# Patient Record
Sex: FEMALE | Race: WHITE | Employment: FULL TIME | URBAN - METROPOLITAN AREA
[De-identification: names, ages, dates, MRNs, and addresses within clinical notes are randomized per-mention and may not be internally consistent; named-entity substitution may affect disease eponyms.]

---

## 2019-10-15 ENCOUNTER — HOSPITAL ENCOUNTER (OUTPATIENT)
Dept: GENERAL RADIOLOGY | Age: 59
Discharge: HOME | End: 2019-10-17
Payer: COMMERCIAL

## 2019-10-15 ENCOUNTER — HOSPITAL ENCOUNTER (OUTPATIENT)
Age: 59
Discharge: HOME | End: 2019-10-17
Payer: COMMERCIAL

## 2019-10-15 DIAGNOSIS — K59.00 CONSTIPATION, UNSPECIFIED CONSTIPATION TYPE: ICD-10-CM

## 2019-10-15 PROCEDURE — 74022 RADEX COMPL AQT ABD SERIES: CPT

## 2023-01-27 DIAGNOSIS — R20.2 LEFT LEG PARESTHESIAS: Primary | ICD-10-CM

## 2023-01-30 ENCOUNTER — PROCEDURE VISIT (OUTPATIENT)
Dept: PHYSICAL MEDICINE AND REHAB | Age: 63
End: 2023-01-30
Payer: MEDICARE

## 2023-01-30 VITALS — WEIGHT: 158 LBS | HEIGHT: 68 IN | BODY MASS INDEX: 23.95 KG/M2

## 2023-01-30 DIAGNOSIS — R20.2 LEFT LEG PARESTHESIAS: ICD-10-CM

## 2023-01-30 DIAGNOSIS — M54.17 RADICULOPATHY, LUMBOSACRAL REGION: Primary | ICD-10-CM

## 2023-01-30 PROCEDURE — 95886 MUSC TEST DONE W/N TEST COMP: CPT | Performed by: PHYSICAL MEDICINE & REHABILITATION

## 2023-01-30 PROCEDURE — 1123F ACP DISCUSS/DSCN MKR DOCD: CPT | Performed by: PHYSICAL MEDICINE & REHABILITATION

## 2023-01-30 PROCEDURE — 95909 NRV CNDJ TST 5-6 STUDIES: CPT | Performed by: PHYSICAL MEDICINE & REHABILITATION

## 2023-01-30 PROCEDURE — 99202 OFFICE O/P NEW SF 15 MIN: CPT | Performed by: PHYSICAL MEDICINE & REHABILITATION

## 2023-01-30 NOTE — PROGRESS NOTES
[facility]  Electrodiagnostic Laboratory  *Accredited by the AANEM with exemplary status  [address] [facility] [place]  Phone: [phone]  Fax: [phone]      Date of Examination: 01/30/23  Patient Name: Odell Bourgeois    Chief Complaint   Patient presents with    Extremity Pain     Pain from the left glute down the back of the leg. Since October. No acute injury. 10/10 pain. Numbness     Numbness/tingling/burning in the bottom of leg and into the left foot. Extremity Weakness     Weakness when pain is present. Worse at the end of the day. Odell Bourgeois  is a 70y.o. year old female who was seen due to complaints of low back pain that radiates into left leg that has been present for over 3 months and started after no injury. Physical Exam: MSK: There is no joint effusion, deformity, instability, swelling, erythema or warmth. AROM is decreased in the spine. Neurologic:  No focal sensorimotor deficit. Impression:     1. Radiculopathy, lumbosacral region          Plan:   EMG is indicated to evaluate the above diagnosis. EMG was done today and showed left S1 motor radiculopathy. See report for full details. Recommend correlation with lumbar MRI, conservative treatment with physical therapy. Advised patient to follow up with referring provider. Thank you for allowing me to participate in the care of your patient.       Sincerely,     Stanton Ponce DO, [facility]   Board Certified Physical Medicine and Rehabilitation

## 2023-01-30 NOTE — PROGRESS NOTES
[facility]  Electrodiagnostic Laboratory  *[facility] the [facility] with [facility]  [address] [facility] [place] [address]  Phone: [phone]  Fax: [phone]    Referring Provider: Oksana Morillo APRN - C*  Primary Care Physician: JOANIE Chaney CNP  Patient Name: Patti Weeks  Patient YOB: 1960  Gender: female  BMI: Body mass index is 24.02 kg/m².  Height 5' 8\" (1.727 m), weight 158 lb (71.7 kg).    1/30/2023    Reason for Referral: Left leg paresthesia    Description of clinical problem:   Chief Complaint   Patient presents with    Extremity Pain     Pain from the left glute down the back of the leg. Since October. No acute injury. 10/10 pain.     Numbness     Numbness/tingling/burning in the bottom of leg and into the left foot.    Extremity Weakness     Weakness when pain is present. Worse at the end of the day.        Brief physical exam:   Sensory deficit No; Weakness No; Atrophy  No    Sensory NCS      Nerve / Sites Rec. Site Peak Lat PP Amp Segments Distance Velocity Temp.     ms µV  cm m/s °C   L Sural - Ankle (Calf)      Calf Ankle 4.84 8.9 Calf - Ankle 14 37 31.1   L Superficial peroneal - Ankle      Lat leg Ankle 3.33 4.3 Lat leg - Ankle 10 36 31.1       Motor NCS      Nerve / Sites Muscle Onset Amplitude Segments Distance Velocity Temp.     ms mV  cm m/s °C   L Peroneal - EDB      Ankle EDB 5.47 2.2 Ankle - EDB 8  31.1      Fib head EDB 12.92 2.1 Fib head - Ankle 30 40 31.1      Above Knee EDB 15.21 2.1 Above Knee - Fib head 10 44 31.1   L Tibial - AH      Ankle AH 5.68 11.7 Ankle - AH 8  31      Pop fossa AH 16.46 8.8 Pop fossa - Ankle 42 39 31       F Wave      Nerve Fmin % F    ms %   L Peroneal - EDB 53.96 4.35   L Tibial - AH 55.94 30       H Reflex      Nerve H Lat    ms   L Tibial - Soleus 36.98   R Tibial - Soleus 30.99       EMG      EMG Summary Table     Spontaneous MUAP Recruitment   Muscle Nerve Roots IA Fib PSW Fasc Amp Dur. PPP  Pattern   L. Tibialis anterior Deep peroneal (Fibular) L4-L5 N None None None N N N N   L. Extensor hallucis longus Deep peroneal (Fibular) L5-S1 1+ N 1+ None N N 1+ 1+   L. Gastrocnemius (Medial head) Tibial S1-S2 N None None None N N 1+ 1+   L. Peroneus longus Peroneal L5-S1 N None None None N N N N   L. Biceps femoris (short head) Sciatic (peroneal division) L5-S2 N None None None N N 1+ 1+   L. Vastus medialis Femoral L2-L4 N None None None N N N N   L. Gluteus medius Superior gluteal L4-S1 N None None None N N N 1+   L. Gluteus erik Inferior gluteal L5-S2 N None None None N N 1+ 1+   L. Lumbar paraspinals (low)  - Incr 1+ 1+ None       L. Lumbar paraspinals (mid)  - N None None None           Study Limitations:  none     Summary of Findings:   Nerve conduction studies: The following nerve conduction studies were abnormal: left tibial H-reflex was prolonged. All other nerve conduction studies, as listed in the table were normal in latency, amplitude and conduction velocity. Needle EMG:   Needle EMG was performed using a concentric needle. The following abnormalities were seen on needle EMG: there was active denervation in the left extensor hallucis longus and lower lumbar paraspinals. There was decreased recruitment with polyphasic potentials in the left extensor hallucis longus, medial gastrocnemius, biceps femoris short head, gluteus medius and gluteus erik muscles. Otherwise, observed motor units were normal in amplitude, duration, phases and recruitment and no active denervation signs were seen. Diagnostic Interpretation: This study was abnormal.      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 It is chronic in duration, moderate in severity. Prognosis for axonal lesions is poor and dependent upon collateral sprouting. Recommend correlation with lumbar MRI.        Previous Study: There is not a prior study for comparison. Follow up EMG is recommended if clinically indicated. Technologist: Parul [place]  Physician:Kristy Fowler 57, DO, [address] [place]   Board Certified Physical Medicine and Rehabilitation      Nerve conduction studies and electromyography were performed according to our laboratory policies and procedures which can be provided upon request. All abnormal values are identified in the table.  Laboratory normal values can also be provided upon request.       Cc: Kennard Prader, [address] [facility] [address], APRN - CNP

## 2023-01-30 NOTE — PATIENT INSTRUCTIONS
Electrodiagnotic Laboratory  Accredited by the [facility] with Exemplary status  GURDEEP Heller D.O. [facility]  [address] [facility] [place]  Phone: [phone]  Fax: [phone]        Today you had an electrodiagnostic exam which included nerve conduction studies (NCS) and electromyography (EMG). This test evaluated the electrical activity of your nerves and muscles to help determine if you have a nerve or muscle disease. This test can help determine the location and type of a nerve or muscle problem. This will help your referring doctor diagnose your condition and determine the appropriate next step in your treatment plan. After your test:    1. There are no long lasting side effects of the test.     2. You may resume your normal activities without restrictions. 3.  Resume any medications that were stopped for the test.     4  If you have sore areas or bruising in your muscles where the needle was placed, apply a cold pack to the sore area for 15-20 minutes three to four times a day as needed for pain. The soreness should go away in about 1-2 days. 5. Your results were provided  Briefly at the end of your test and the final detailed report will be provided to your referring physician, and/or primary care physician and any other parties you requested within 1-2 days of the examination. You may wish to contact your referring provider after a few days to determine what they would like you to do next. 6.  Please call [phone] with any questions or concerns and if you develop increased body temperature/fever, swelling, tenderness, increased pain and/or drainage from the sites where the needle was placed. Thank you for choosing us for your health care needs.

## 2023-02-23 DIAGNOSIS — M54.17 LUMBOSACRAL RADICULITIS: Primary | ICD-10-CM

## 2023-02-24 ENCOUNTER — PREP FOR PROCEDURE (OUTPATIENT)
Dept: PHYSICAL MEDICINE AND REHAB | Age: 63
End: 2023-02-24

## 2023-02-24 PROBLEM — M54.17 LUMBOSACRAL RADICULITIS: Status: ACTIVE | Noted: 2023-02-24

## 2023-02-27 RX ORDER — TRIAMTERENE AND HYDROCHLOROTHIAZIDE 37.5; 25 MG/1; MG/1
CAPSULE ORAL EVERY MORNING
COMMUNITY

## 2023-02-27 RX ORDER — LOSARTAN POTASSIUM 50 MG/1
50 TABLET ORAL DAILY
COMMUNITY

## 2023-02-27 RX ORDER — CLONIDINE HYDROCHLORIDE 0.1 MG/1
0.1 TABLET ORAL DAILY
COMMUNITY

## 2023-02-27 RX ORDER — PRAVASTATIN SODIUM 40 MG
40 TABLET ORAL DAILY
COMMUNITY

## 2023-02-27 RX ORDER — PROPRANOLOL HYDROCHLORIDE 20 MG/1
20 TABLET ORAL 2 TIMES DAILY
COMMUNITY

## 2023-02-27 RX ORDER — CYCLOBENZAPRINE HCL 5 MG
5 TABLET ORAL
COMMUNITY

## 2023-02-28 RX ORDER — SODIUM CHLORIDE 0.9 % (FLUSH) 0.9 %
5-40 SYRINGE (ML) INJECTION PRN
Status: CANCELLED | OUTPATIENT
Start: 2023-02-28

## 2023-02-28 RX ORDER — SODIUM CHLORIDE 9 MG/ML
INJECTION, SOLUTION INTRAVENOUS PRN
Status: CANCELLED | OUTPATIENT
Start: 2023-02-28

## 2023-02-28 RX ORDER — SODIUM CHLORIDE 0.9 % (FLUSH) 0.9 %
5-40 SYRINGE (ML) INJECTION EVERY 12 HOURS SCHEDULED
Status: CANCELLED | OUTPATIENT
Start: 2023-02-28

## 2023-03-02 ENCOUNTER — HOSPITAL ENCOUNTER (OUTPATIENT)
Age: 63
Setting detail: OUTPATIENT SURGERY
Discharge: HOME | End: 2023-03-02
Attending: PHYSICAL MEDICINE & REHABILITATION | Admitting: PHYSICAL MEDICINE & REHABILITATION
Payer: MEDICARE

## 2023-03-02 ENCOUNTER — HOSPITAL ENCOUNTER (OUTPATIENT)
Dept: OPERATING ROOM | Age: 63
Setting detail: OUTPATIENT SURGERY
Discharge: HOME | End: 2023-03-02
Attending: PHYSICAL MEDICINE & REHABILITATION
Payer: MEDICARE

## 2023-03-02 VITALS
SYSTOLIC BLOOD PRESSURE: 109 MMHG | RESPIRATION RATE: 63 BRPM | HEIGHT: 68 IN | BODY MASS INDEX: 23.95 KG/M2 | HEART RATE: 63 BPM | WEIGHT: 158 LBS | OXYGEN SATURATION: 99 % | DIASTOLIC BLOOD PRESSURE: 65 MMHG

## 2023-03-02 DIAGNOSIS — M51.9 LUMBAR DISC DISEASE: ICD-10-CM

## 2023-03-02 DIAGNOSIS — M54.17 LUMBOSACRAL RADICULITIS: ICD-10-CM

## 2023-03-02 PROCEDURE — 3209999900 FLUORO FOR SURGICAL PROCEDURES

## 2023-03-02 PROCEDURE — 7100000011 HC PHASE II RECOVERY - ADDTL 15 MIN: Performed by: PHYSICAL MEDICINE & REHABILITATION

## 2023-03-02 PROCEDURE — 6360000004 HC RX CONTRAST MEDICATION: Performed by: PHYSICAL MEDICINE & REHABILITATION

## 2023-03-02 PROCEDURE — 6360000002 HC RX W HCPCS: Performed by: PHYSICAL MEDICINE & REHABILITATION

## 2023-03-02 PROCEDURE — 3600000005 HC SURGERY LEVEL 5 BASE: Performed by: PHYSICAL MEDICINE & REHABILITATION

## 2023-03-02 PROCEDURE — 7100000010 HC PHASE II RECOVERY - FIRST 15 MIN: Performed by: PHYSICAL MEDICINE & REHABILITATION

## 2023-03-02 PROCEDURE — 2500000003 HC RX 250 WO HCPCS: Performed by: PHYSICAL MEDICINE & REHABILITATION

## 2023-03-02 PROCEDURE — 2580000003 HC RX 258: Performed by: PHYSICAL MEDICINE & REHABILITATION

## 2023-03-02 PROCEDURE — A4216 STERILE WATER/SALINE, 10 ML: HCPCS | Performed by: PHYSICAL MEDICINE & REHABILITATION

## 2023-03-02 PROCEDURE — 2709999900 HC NON-CHARGEABLE SUPPLY: Performed by: PHYSICAL MEDICINE & REHABILITATION

## 2023-03-02 RX ORDER — LIDOCAINE HYDROCHLORIDE 10 MG/ML
INJECTION, SOLUTION EPIDURAL; INFILTRATION; INTRACAUDAL; PERINEURAL PRN
Status: DISCONTINUED | OUTPATIENT
Start: 2023-03-02 | End: 2023-03-02 | Stop reason: ALTCHOICE

## 2023-03-02 RX ORDER — SODIUM CHLORIDE 9 MG/ML
INJECTION, SOLUTION INTRAVENOUS PRN
Status: DISCONTINUED | OUTPATIENT
Start: 2023-03-02 | End: 2023-03-02 | Stop reason: HOSPADM

## 2023-03-02 RX ORDER — SODIUM CHLORIDE 0.9 % (FLUSH) 0.9 %
5-40 SYRINGE (ML) INJECTION EVERY 12 HOURS SCHEDULED
Status: DISCONTINUED | OUTPATIENT
Start: 2023-03-02 | End: 2023-03-02 | Stop reason: HOSPADM

## 2023-03-02 RX ORDER — SODIUM CHLORIDE 0.9 % (FLUSH) 0.9 %
5-40 SYRINGE (ML) INJECTION PRN
Status: DISCONTINUED | OUTPATIENT
Start: 2023-03-02 | End: 2023-03-02 | Stop reason: HOSPADM

## 2023-03-02 ASSESSMENT — PAIN - FUNCTIONAL ASSESSMENT: PAIN_FUNCTIONAL_ASSESSMENT: 0-10

## 2023-03-02 ASSESSMENT — PAIN DESCRIPTION - LOCATION
LOCATION: BACK
LOCATION: BACK

## 2023-03-02 ASSESSMENT — PAIN DESCRIPTION - PAIN TYPE
TYPE: SURGICAL PAIN;CHRONIC PAIN
TYPE: CHRONIC PAIN;SURGICAL PAIN

## 2023-03-02 ASSESSMENT — PAIN SCALES - GENERAL
PAINLEVEL_OUTOF10: 6
PAINLEVEL_OUTOF10: 7

## 2023-03-02 NOTE — OP NOTE
LUMBOSACRAL EPIDURAL STEROID INJECTION, TRANSFORAMINAL APPROACH       WITH FLUOROSCOPIC GUIDANCE    Patient: Amauri Blanca                         MRN#: [id number]  : 1960   Date of procedure: 3/2/2023      Physician Performing Procedure:  Serena Canales DO    Clinical Scenario: As per electronic documentation. Diagnosis: lumbar radiculitis     Injectate: A total of 3cc, consisting of 1 cc of Dexamethasone 10mg/ml,  with the remainder of normal saline    Levels Treated:  left L4-5 neural foramen    Approach:   Transforaminal    Improvement after today's procedure: As per nursing record. Comments:  none     Pre-procedural evaluation: The patient was examined today just prior to performing the procedure listed above. The patient's heart rate was normal, lungs were clear to auscultation bilaterally. Procedure: The patient was prepped and draped in a sterile fashion in the prone position after informed consent was signed and all the patient's questions were answered including the risks, benefits, alternative treatment options, and prognosis. The risks include - but are not limited to - infection, allergic reaction, increased pain, lack of therapeutic benefit, steroid reaction, nerve damage, paralysis, stroke, epidural hematoma, syncope, headache, respiratory or cardiac arrest, and scar formation. The C-arm was positioned so that an oblique view of the neural foramen as noted above was visualized. The soft tissues overlying this structure were infiltrated with 2-3 cc. of 1% Lidocaine without Epinephrine. A 22 gauge 3.5 inch spinal needle was inserted toward the target using a trajectory view along the fluoroscope beam.  Under AP and lateral visualization, the needle was advanced so it did not puncture dura. Biplanar projections were used to confirm position. Aspiration was confirmed to be negative for CSF and/or blood. A 1-2 cc. volume of Isovue contrast was injected at this level. The contrast was observed to flow under the pedicle. Radiographs were obtained for documentation purposes. After attaining flow of contrast documented above, the above injectate was administered into the transforaminal epidural space. The patient tolerated the procedure well and was discharged after an appropriate period of observation. If there are any complications, the patient was instructed to call us. The patient is to follow-up with the requesting physician after the injection, preferably within three weeks.     Yanet Cordero DO, [facility]   Board Certified Physical Medicine and Rehabilitation

## 2023-03-02 NOTE — H&P
Juan C El, [address] [facility] Physical Medicine and Rehabilitation  [address] [facility] [facility] [facility] [place]  Phone: [phone]  Fax: [phone]    PCP: JOANIE Lockett CNP  Date of visit: 3/2/2023    CC: low back and leg pain       Ramya Johnson is a 70 y.o. female who presents today for epidural steroid injection. Patient complains of low back and leg pain. No red flag symptoms. Consents to proceed with procedure. Allergies   Allergen Reactions    Avelox [Moxifloxacin] Swelling    Penicillins Rash       Current Facility-Administered Medications   Medication Dose Route Frequency Provider Last Rate Last Admin    sodium chloride flush 0.9 % injection 5-40 mL  5-40 mL IntraVENous 2 times per day Kristy Christiansen, DO        sodium chloride flush 0.9 % injection 5-40 mL  5-40 mL IntraVENous PRN Kristy Christiansen, DO        0.9 % sodium chloride infusion   IntraVENous PRN Kristy Christiansen, DO           History reviewed. No pertinent past medical history. History reviewed. No pertinent surgical history. No family history on file. ROS:    Constitutional: Denies fevers, chills, night sweats, unintentional weight loss     Skin: Denies rash or skin changes     Respiratory: Denies SOB or cough     Cardiovascular: Denies CP, palpitations, edema      Neurologic: See HPI.     MSK: See HPI. Hematologic/Lymphatic/Immunologic: Denies bruising       Physical Exam:   Blood pressure 104/60, pulse 71, resp. rate 18, height 5' 8\" (1.727 m), weight 158 lb (71.7 kg), SpO2 100 %. General: well developed and well nourished in no acute distress  Resp: symmetrical chest expansion, unlabored breathing, respirations unlabored. CV: Heart rate is regular. Peripheral pulses are palpable  Skin: No rashes or ecchymosis. Normal turgor.    MSK: ttp lumbar psps          Impression:     Lumbar radiculitis      Plan:   Injection as planned today           [place] Aris Man, [address] [place] [address]   Board Certified Physical Medicine and Rehabilitation

## 2023-03-02 NOTE — DISCHARGE INSTRUCTIONS
Post-op instruction Block  Dr. Christiansen  [phone]      Rest 12-24 hours following procedure. DO NOT DRIVE till the following day.    Keep dressing clean and dry. Do not bathe, shower, or sit in hot tub the day of procedure .You may remove the dressing following A.M.    You may return to work/school tomorrow.    Drink extra fluids for the next 24 hours.    Resume your regular diet.    Resume previously prescribed medications. Resume Coumadin, Plavix, NSAIDS, Ibuprofen products 24 hours after procedure.    You need to have a responsible adult stay with you this evening.    If you experience any discomfort relating to this procedure, you may take Tylenol 2 tablets every 4 hrs as needed for pain and/or apply ice to the affected area.    Please follow up with Dr. Christiansen or Dr. Padgett. If you were a hip injection follow up with your orthopedic Doctor.    If you experience any unusual symptoms or problems, call your physician’s answering service at [phone] or go directly to [facility]’s Emergency Department.

## 2023-03-15 RX ORDER — SODIUM CHLORIDE 9 MG/ML
INJECTION, SOLUTION INTRAVENOUS PRN
Status: CANCELLED | OUTPATIENT
Start: 2023-03-15

## 2023-03-15 RX ORDER — SODIUM CHLORIDE 0.9 % (FLUSH) 0.9 %
5-40 SYRINGE (ML) INJECTION EVERY 12 HOURS SCHEDULED
Status: CANCELLED | OUTPATIENT
Start: 2023-03-15

## 2023-03-15 RX ORDER — SODIUM CHLORIDE 0.9 % (FLUSH) 0.9 %
5-40 SYRINGE (ML) INJECTION PRN
Status: CANCELLED | OUTPATIENT
Start: 2023-03-15

## 2023-03-16 ENCOUNTER — HOSPITAL ENCOUNTER (OUTPATIENT)
Age: 63
Setting detail: OUTPATIENT SURGERY
Discharge: HOME | End: 2023-03-16
Attending: PHYSICAL MEDICINE & REHABILITATION | Admitting: PHYSICAL MEDICINE & REHABILITATION
Payer: MEDICARE

## 2023-03-16 ENCOUNTER — HOSPITAL ENCOUNTER (OUTPATIENT)
Dept: OPERATING ROOM | Age: 63
Setting detail: OUTPATIENT SURGERY
Discharge: HOME | End: 2023-03-16
Attending: PHYSICAL MEDICINE & REHABILITATION
Payer: MEDICARE

## 2023-03-16 VITALS
WEIGHT: 158 LBS | OXYGEN SATURATION: 98 % | HEIGHT: 68 IN | SYSTOLIC BLOOD PRESSURE: 110 MMHG | RESPIRATION RATE: 14 BRPM | HEART RATE: 68 BPM | BODY MASS INDEX: 23.95 KG/M2 | DIASTOLIC BLOOD PRESSURE: 65 MMHG | TEMPERATURE: 98 F

## 2023-03-16 DIAGNOSIS — M51.9 LUMBAR DISC DISEASE: ICD-10-CM

## 2023-03-16 PROCEDURE — 6360000004 HC RX CONTRAST MEDICATION: Performed by: PHYSICAL MEDICINE & REHABILITATION

## 2023-03-16 PROCEDURE — 3209999900 FLUORO FOR SURGICAL PROCEDURES

## 2023-03-16 PROCEDURE — A4216 STERILE WATER/SALINE, 10 ML: HCPCS | Performed by: PHYSICAL MEDICINE & REHABILITATION

## 2023-03-16 PROCEDURE — 6360000002 HC RX W HCPCS: Performed by: PHYSICAL MEDICINE & REHABILITATION

## 2023-03-16 PROCEDURE — 2709999900 HC NON-CHARGEABLE SUPPLY: Performed by: PHYSICAL MEDICINE & REHABILITATION

## 2023-03-16 PROCEDURE — 2580000003 HC RX 258: Performed by: PHYSICAL MEDICINE & REHABILITATION

## 2023-03-16 PROCEDURE — 7100000010 HC PHASE II RECOVERY - FIRST 15 MIN: Performed by: PHYSICAL MEDICINE & REHABILITATION

## 2023-03-16 PROCEDURE — 2500000003 HC RX 250 WO HCPCS: Performed by: PHYSICAL MEDICINE & REHABILITATION

## 2023-03-16 PROCEDURE — 3600000005 HC SURGERY LEVEL 5 BASE: Performed by: PHYSICAL MEDICINE & REHABILITATION

## 2023-03-16 RX ORDER — SODIUM CHLORIDE 0.9 % (FLUSH) 0.9 %
5-40 SYRINGE (ML) INJECTION EVERY 12 HOURS SCHEDULED
Status: DISCONTINUED | OUTPATIENT
Start: 2023-03-16 | End: 2023-03-16 | Stop reason: HOSPADM

## 2023-03-16 RX ORDER — SODIUM CHLORIDE 9 MG/ML
INJECTION, SOLUTION INTRAVENOUS PRN
Status: DISCONTINUED | OUTPATIENT
Start: 2023-03-16 | End: 2023-03-16 | Stop reason: HOSPADM

## 2023-03-16 RX ORDER — SODIUM CHLORIDE 0.9 % (FLUSH) 0.9 %
5-40 SYRINGE (ML) INJECTION PRN
Status: DISCONTINUED | OUTPATIENT
Start: 2023-03-16 | End: 2023-03-16 | Stop reason: HOSPADM

## 2023-03-16 RX ORDER — LIDOCAINE HYDROCHLORIDE 10 MG/ML
INJECTION, SOLUTION EPIDURAL; INFILTRATION; INTRACAUDAL; PERINEURAL PRN
Status: DISCONTINUED | OUTPATIENT
Start: 2023-03-16 | End: 2023-03-16 | Stop reason: ALTCHOICE

## 2023-03-16 ASSESSMENT — PAIN - FUNCTIONAL ASSESSMENT: PAIN_FUNCTIONAL_ASSESSMENT: 0-10

## 2023-03-16 ASSESSMENT — PAIN DESCRIPTION - DESCRIPTORS: DESCRIPTORS: ACHING

## 2023-03-16 NOTE — OP NOTE
LUMBOSACRAL EPIDURAL STEROID INJECTION, TRANSFORAMINAL APPROACH       WITH FLUOROSCOPIC GUIDANCE    Patient: Wallace Perdomo                         MRN#: [id number]  : 1960   Date of procedure: 3/16/2023      Physician Performing Procedure:  Compa Baires DO    Clinical Scenario: As per electronic documentation. Diagnosis: lumbar radiculitis     Injectate: A total of 3cc, consisting of 1 cc of Dexamethasone 10mg/ml,  with the remainder of normal saline    Levels Treated:  left L4-5 neural foramen    Approach:  Transforaminal    Improvement after today's procedure: As per nursing record. Comments:  none     Pre-procedural evaluation: The patient was examined today just prior to performing the procedure listed above. The patient's heart rate was normal, lungs were clear to auscultation bilaterally. Procedure: The patient was prepped and draped in a sterile fashion in the prone position after informed consent was signed and all the patient's questions were answered including the risks, benefits, alternative treatment options, and prognosis. The risks include - but are not limited to - infection, allergic reaction, increased pain, lack of therapeutic benefit, steroid reaction, nerve damage, paralysis, stroke, epidural hematoma, syncope, headache, respiratory or cardiac arrest, and scar formation. The C-arm was positioned so that an oblique view of the neural foramen as noted above was visualized. The soft tissues overlying this structure were infiltrated with 2-3 cc. of 1% Lidocaine without Epinephrine. A 22 gauge 3.5 inch spinal needle was inserted toward the target using a trajectory view along the fluoroscope beam.  Under AP and lateral visualization, the needle was advanced so it did not puncture dura. Biplanar projections were used to confirm position. Aspiration was confirmed to be negative for CSF and/or blood. A 1-2 cc. volume of Isovue contrast was injected at this level. The contrast was observed to flow under the pedicle. Radiographs were obtained for documentation purposes. After attaining flow of contrast documented above, the above injectate was administered into the transforaminal epidural space. The patient tolerated the procedure well and was discharged after an appropriate period of observation. If there are any complications, the patient was instructed to call us. The patient is to follow-up with the requesting physician after the injection, preferably within three weeks.     Smith Marin DO, [facility]   Board Certified Physical Medicine and Rehabilitation

## 2023-03-16 NOTE — DISCHARGE INSTRUCTIONS
Post-op instruction Block  Dr. Joseph Pride  [phone]      Rest 12-24 hours following procedure. DO NOT DRIVE till the following day. Keep dressing clean and dry. Do not bathe, shower, or sit in hot tub the day of procedure . You may remove the dressing following A.M. You may return to work/school tomorrow. Drink extra fluids for the next 24 hours. Resume your regular diet. Resume previously prescribed medications. Resume Coumadin, Plavix, NSAIDS, Ibuprofen products 24 hours after procedure. You need to have a responsible adult stay with you this evening. If you experience any discomfort relating to this procedure, you may take Tylenol 2 tablets every 4 hrs as needed for pain and/or apply ice to the affected area. Please follow up with Dr. Joseph Pride or Dr. Alex Williamson. If you were a hip injection follow up with your orthopedic Doctor. If you experience any unusual symptoms or problems, call your physicians answering service at [phone] or go directly to [facility]. [facility] Emergency Department. Infection After Surgery: Care Instructions  Overview  After surgery, an infection is always possible. It doesn't mean that the surgery didn't go well. Because an infection can be serious, your doctor has taken steps to manage it. Your doctor checked the infection and cleaned it if necessary. Your doctor may have made an opening in the area so that the pus can drain out. You may have gauze in the cut so that the area will stay open and keep draining. You may need antibiotics. You will need to follow up with your doctor to make sure the infection has gone away. Follow-up care is a key part of your treatment and safety. Be sure to make and go to all appointments, and call your doctor if you are having problems. It's also a good idea to know your test results and keep a list of the medicines you take. How can you care for yourself at home?   Make sure your surgeon knows about the infection, especially if you saw another doctor about your symptoms. If your doctor prescribed antibiotics, take them as directed. Do not stop taking them just because you feel better. You need to take the full course of antibiotics. Ask your doctor if you can take an over-the-counter pain medicine, such as acetaminophen (Tylenol), ibuprofen (Advil, Motrin), or naproxen (Aleve). Be safe with medicines. Read and follow all instructions on the label. Do not take two or more pain medicines at the same time unless the doctor told you to. Many pain medicines have acetaminophen, which is Tylenol. Too much acetaminophen (Tylenol) can be harmful. Prop up the area on a pillow anytime you sit or lie down during the next 3 days. Try to keep it above the level of your heart. This will help reduce swelling. Keep the skin clean and dry. You may have a bandage over the cut (incision). A bandage helps the incision heal and protects it. Your doctor will tell you how to take care of this. Keep it clean and dry. You may have drainage from the wound. If your doctor told you how to care for your incision, follow your doctor's instructions. If you did not get instructions, follow this general advice:  Wash around the incision with clean water 2 times a day. Don't use hydrogen peroxide or alcohol, which can slow healing. When should you call for help? Call your doctor now or seek immediate medical care if:    You have signs that your infection is getting worse, such as: Increased pain, swelling, warmth, or redness in the area. Red streaks leading from the area. Pus draining from the wound. A new or higher fever. Watch closely for changes in your health, and be sure to contact your doctor if you have any problems. Where can you learn more? Go to http://www.woods.com/ and enter C340 to learn more about \"Infection After Surgery: Care Instructions. \"  Current as of: January 20, 2022 Content Version: 13.5  © 3950-0064 [employer]. Care instructions adapted under license by [facility] ([facility]). If you have questions about a medical condition or this instruction, always ask your healthcare professional. [address] any warranty or liability for your use of this information.

## 2023-04-26 DIAGNOSIS — M54.17 LUMBOSACRAL RADICULITIS: ICD-10-CM

## 2023-06-05 ENCOUNTER — OFFICE VISIT (OUTPATIENT)
Dept: PRIMARY CARE | Facility: CLINIC | Age: 63
End: 2023-06-05
Payer: MEDICARE

## 2023-06-05 VITALS
SYSTOLIC BLOOD PRESSURE: 116 MMHG | DIASTOLIC BLOOD PRESSURE: 70 MMHG | OXYGEN SATURATION: 99 % | HEART RATE: 66 BPM | HEIGHT: 68 IN | BODY MASS INDEX: 23.64 KG/M2 | RESPIRATION RATE: 14 BRPM | WEIGHT: 156 LBS

## 2023-06-05 DIAGNOSIS — N18.31 CHRONIC KIDNEY DISEASE, STAGE 3A (MULTI): ICD-10-CM

## 2023-06-05 DIAGNOSIS — E78.2 MIXED HYPERLIPIDEMIA: ICD-10-CM

## 2023-06-05 DIAGNOSIS — M54.17 LUMBOSACRAL RADICULITIS: ICD-10-CM

## 2023-06-05 DIAGNOSIS — I10 ESSENTIAL HYPERTENSION: ICD-10-CM

## 2023-06-05 DIAGNOSIS — F40.10 SOCIAL ANXIETY DISORDER: ICD-10-CM

## 2023-06-05 DIAGNOSIS — Z00.00 WELL ADULT EXAM: Primary | ICD-10-CM

## 2023-06-05 DIAGNOSIS — Z78.0 POSTMENOPAUSAL: ICD-10-CM

## 2023-06-05 DIAGNOSIS — Z12.31 VISIT FOR SCREENING MAMMOGRAM: ICD-10-CM

## 2023-06-05 DIAGNOSIS — Z23 ENCOUNTER FOR IMMUNIZATION: ICD-10-CM

## 2023-06-05 DIAGNOSIS — R80.9 PROTEINURIA, UNSPECIFIED TYPE: Chronic | ICD-10-CM

## 2023-06-05 DIAGNOSIS — R19.5 POSITIVE COLORECTAL CANCER SCREENING USING COLOGUARD TEST: ICD-10-CM

## 2023-06-05 PROBLEM — F12.90 MARIJUANA USE: Status: ACTIVE | Noted: 2023-06-05

## 2023-06-05 PROCEDURE — G0009 ADMIN PNEUMOCOCCAL VACCINE: HCPCS | Performed by: FAMILY MEDICINE

## 2023-06-05 PROCEDURE — 1160F RVW MEDS BY RX/DR IN RCRD: CPT | Performed by: FAMILY MEDICINE

## 2023-06-05 PROCEDURE — 1159F MED LIST DOCD IN RCRD: CPT | Performed by: FAMILY MEDICINE

## 2023-06-05 PROCEDURE — 1036F TOBACCO NON-USER: CPT | Performed by: FAMILY MEDICINE

## 2023-06-05 PROCEDURE — 3074F SYST BP LT 130 MM HG: CPT | Performed by: FAMILY MEDICINE

## 2023-06-05 PROCEDURE — 90677 PCV20 VACCINE IM: CPT | Performed by: FAMILY MEDICINE

## 2023-06-05 PROCEDURE — 3078F DIAST BP <80 MM HG: CPT | Performed by: FAMILY MEDICINE

## 2023-06-05 PROCEDURE — 99387 INIT PM E/M NEW PAT 65+ YRS: CPT | Performed by: FAMILY MEDICINE

## 2023-06-05 RX ORDER — TRIAMTERENE/HYDROCHLOROTHIAZID 37.5-25 MG
0.5 TABLET ORAL DAILY
Status: SHIPPED
Start: 2023-06-05

## 2023-06-05 RX ORDER — CYCLOBENZAPRINE HCL 5 MG
TABLET ORAL
COMMUNITY
Start: 2022-11-18 | End: 2023-06-05 | Stop reason: SDUPTHER

## 2023-06-05 RX ORDER — LOSARTAN POTASSIUM 50 MG/1
1 TABLET ORAL DAILY
COMMUNITY
Start: 2022-10-20 | End: 2023-07-31 | Stop reason: SDUPTHER

## 2023-06-05 RX ORDER — CLONIDINE HYDROCHLORIDE 0.1 MG/1
0.1 TABLET ORAL DAILY
Status: SHIPPED
Start: 2023-06-05

## 2023-06-05 RX ORDER — TRIAMTERENE/HYDROCHLOROTHIAZID 37.5-25 MG
1 TABLET ORAL DAILY
COMMUNITY
Start: 2022-10-20 | End: 2023-06-05 | Stop reason: DRUGHIGH

## 2023-06-05 RX ORDER — PROPRANOLOL HYDROCHLORIDE 20 MG/1
TABLET ORAL EVERY 8 HOURS
COMMUNITY
Start: 2022-10-20 | End: 2023-06-05 | Stop reason: DRUGHIGH

## 2023-06-05 RX ORDER — PRAVASTATIN SODIUM 40 MG/1
1 TABLET ORAL DAILY
COMMUNITY
Start: 2022-10-20

## 2023-06-05 RX ORDER — CYCLOBENZAPRINE HCL 5 MG
5 TABLET ORAL 3 TIMES DAILY PRN
Qty: 30 TABLET
Start: 2023-06-05 | End: 2023-07-12 | Stop reason: SDUPTHER

## 2023-06-05 RX ORDER — PROPRANOLOL HYDROCHLORIDE 20 MG/1
10 TABLET ORAL 2 TIMES DAILY
Status: SHIPPED
Start: 2023-06-05

## 2023-06-05 RX ORDER — CLONIDINE HYDROCHLORIDE 0.1 MG/1
TABLET ORAL EVERY 8 HOURS
COMMUNITY
Start: 2022-10-20 | End: 2023-06-05 | Stop reason: DRUGHIGH

## 2023-06-05 ASSESSMENT — PATIENT HEALTH QUESTIONNAIRE - PHQ9
10. IF YOU CHECKED OFF ANY PROBLEMS, HOW DIFFICULT HAVE THESE PROBLEMS MADE IT FOR YOU TO DO YOUR WORK, TAKE CARE OF THINGS AT HOME, OR GET ALONG WITH OTHER PEOPLE: SOMEWHAT DIFFICULT
2. FEELING DOWN, DEPRESSED OR HOPELESS: SEVERAL DAYS
1. LITTLE INTEREST OR PLEASURE IN DOING THINGS: SEVERAL DAYS
SUM OF ALL RESPONSES TO PHQ9 QUESTIONS 1 AND 2: 2

## 2023-06-05 NOTE — PROGRESS NOTES
Subjective   Patient ID: Yolis Garza is a 63 y.o. female who presents for New Patient Visit (Annual exam /).    63 y.o. female here to establish care. Past medical history, past surgical history, medications, allergies, family history, and social history reviewed with patient and updated in chart.     Has stage 3a chronic kidney disease secondary to hypertension. Follows with nephrology, Dr. Barros.         Well Adult Physical   Patient here for a comprehensive physical exam.The patient reports no problems    Yolis reports her health as Good.    Does the patient take any herbs or supplements that were not prescribed by a doctor? yes   Is the patiet taking calcium supplements? no   Is the patient taking aspirin daily? no    Outpatient Medications Prior to Visit   Medication Sig Dispense Refill    cyclobenzaprine (Flexeril) 5 mg tablet TAKE 1 TABLET BY MOUTH THREE (3) TIMES DAILY AS NEEDED      losartan (Cozaar) 50 mg tablet Take 1 tablet (50 mg) by mouth once daily.      pravastatin (Pravachol) 40 mg tablet Take 1 tablet (40 mg) by mouth once daily.      cloNIDine (Catapres) 0.1 mg tablet Take by mouth every 8 hours.      propranolol (Inderal) 20 mg tablet Take by mouth every 8 hours.      triamterene-hydrochlorothiazid (Maxzide-25) 37.5-25 mg tablet Take 1 tablet by mouth once daily.       No facility-administered medications prior to visit.       Social History     Socioeconomic History    Marital status:      Spouse name: None    Number of children: 1    Years of education: None    Highest education level: None   Occupational History    None   Tobacco Use    Smoking status: Former     Packs/day: 1.50     Years: 14.00     Pack years: 21.00     Types: Cigarettes     Start date:      Quit date:      Years since quittin.4    Smokeless tobacco: Never   Vaping Use    Vaping status: Never Used   Substance and Sexual Activity    Alcohol use: Not Currently    Drug use: Yes     Types: Marijuana     "Sexual activity: Not Currently   Other Topics Concern    None   Social History Narrative    None     Social Determinants of Health     Financial Resource Strain: Not on file   Food Insecurity: Not on file   Transportation Needs: Not on file   Physical Activity: Not on file   Stress: Not on file   Social Connections: Not on file   Intimate Partner Violence: Not on file   Housing Stability: Not on file        History:  LMP: At age 35  Menopause at 35 years (surgical menopause)  Last pap date: 20 years ago   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 Had normal paps after that.   : 1  Para: 1    E-cigarette/Vaping       Questions Responses    E-cigarette/Vaping Use Never User            Last Dental Exam: 6 months or less    Last Eye Exam: 6 months or less    Diet:  renal diet    Exercise: daily    Health Maintenance Due   Topic Date Due    Medicare Initial Physical (IPPE)  Never done    Lipid Panel  Never done    Bone Density Scan  Never done    MMR Vaccines (1 of 1 - Standard series) Never done    Hepatitis C Screening  Never done    Mammogram  Never done    COVID-19 Vaccine (5 - Booster for Pfizer series) 2022    Pneumococcal Vaccine: 65+ Years (2 - PPSV23 if available, else PCV20) 2023            Review of Systems      Objective   /70   Pulse 66   Resp 14   Ht 1.727 m (5' 8\")   Wt 70.8 kg (156 lb)   SpO2 99%   BMI 23.72 kg/m²     Physical Exam      Assessment/Plan   Problem List Items Addressed This Visit       Chronic kidney disease, stage 3a     - follows with nephrology, Dr. Barros          Essential hypertension     - controlled. Continue clonidine, losartan, propranolol, maxzide         Relevant Medications    losartan (Cozaar) 50 mg tablet    triamterene-hydrochlorothiazid (Maxzide-25) 37.5-25 mg tablet    cloNIDine (Catapres) 0.1 mg tablet    propranolol (Inderal) 20 mg tablet    Lumbosacral radiculitis     - follows with Dr. Cox-cari         Mixed " hyperlipidemia     - continue pravastatin         Relevant Medications    pravastatin (Pravachol) 40 mg tablet    Positive colorectal cancer screening using Cologuard test     -1/2023  - had colonoscopy 2/8/23 that showed 1 polyp and also showed diverticulosis. Follow up colonoscopy in 5 years         Proteinuria (Chronic)     - secondary to chronic kidney disease. Follows with nephrology          Social anxiety disorder     - continue clonidine and propranolol         Relevant Medications    cloNIDine (Catapres) 0.1 mg tablet    propranolol (Inderal) 20 mg tablet    Visit for screening mammogram     - mammogram August 2022 with [facility]   Will be due again in August 2023          Other Visit Diagnoses       Well adult exam    -  Primary    Postmenopausal        Encounter for immunization

## 2023-06-05 NOTE — ASSESSMENT & PLAN NOTE
-1/2023  - had colonoscopy 2/8/23 that showed 1 polyp and also showed diverticulosis. Follow up colonoscopy in 5 years

## 2023-07-12 ENCOUNTER — OFFICE VISIT (OUTPATIENT)
Dept: PRIMARY CARE | Facility: CLINIC | Age: 63
End: 2023-07-12
Payer: MEDICARE

## 2023-07-12 VITALS
SYSTOLIC BLOOD PRESSURE: 110 MMHG | BODY MASS INDEX: 23.64 KG/M2 | OXYGEN SATURATION: 96 % | HEIGHT: 68 IN | WEIGHT: 156 LBS | HEART RATE: 72 BPM | TEMPERATURE: 98.1 F | DIASTOLIC BLOOD PRESSURE: 80 MMHG | RESPIRATION RATE: 16 BRPM

## 2023-07-12 DIAGNOSIS — J02.9 SORE THROAT: ICD-10-CM

## 2023-07-12 DIAGNOSIS — M54.17 LUMBOSACRAL RADICULITIS: ICD-10-CM

## 2023-07-12 DIAGNOSIS — R09.89 RUNNY NOSE: ICD-10-CM

## 2023-07-12 DIAGNOSIS — R05.1 ACUTE COUGH: ICD-10-CM

## 2023-07-12 DIAGNOSIS — J02.9 ACUTE PHARYNGITIS, UNSPECIFIED ETIOLOGY: Primary | ICD-10-CM

## 2023-07-12 DIAGNOSIS — H65.93 BILATERAL NON-SUPPURATIVE OTITIS MEDIA: ICD-10-CM

## 2023-07-12 LAB — POC RAPID STREP: NEGATIVE

## 2023-07-12 PROCEDURE — 1160F RVW MEDS BY RX/DR IN RCRD: CPT | Performed by: FAMILY MEDICINE

## 2023-07-12 PROCEDURE — 99213 OFFICE O/P EST LOW 20 MIN: CPT | Performed by: FAMILY MEDICINE

## 2023-07-12 PROCEDURE — 1159F MED LIST DOCD IN RCRD: CPT | Performed by: FAMILY MEDICINE

## 2023-07-12 PROCEDURE — 87880 STREP A ASSAY W/OPTIC: CPT | Performed by: FAMILY MEDICINE

## 2023-07-12 PROCEDURE — 1036F TOBACCO NON-USER: CPT | Performed by: FAMILY MEDICINE

## 2023-07-12 PROCEDURE — 87635 SARS-COV-2 COVID-19 AMP PRB: CPT

## 2023-07-12 PROCEDURE — 87081 CULTURE SCREEN ONLY: CPT

## 2023-07-12 PROCEDURE — 3074F SYST BP LT 130 MM HG: CPT | Performed by: FAMILY MEDICINE

## 2023-07-12 PROCEDURE — 3079F DIAST BP 80-89 MM HG: CPT | Performed by: FAMILY MEDICINE

## 2023-07-12 RX ORDER — CYCLOBENZAPRINE HCL 5 MG
5 TABLET ORAL 3 TIMES DAILY PRN
Qty: 270 TABLET | Refills: 1 | Status: SHIPPED | OUTPATIENT
Start: 2023-07-12 | End: 2024-01-08

## 2023-07-12 RX ORDER — AZITHROMYCIN 250 MG/1
TABLET, FILM COATED ORAL
Qty: 6 TABLET | Refills: 0 | Status: SHIPPED | OUTPATIENT
Start: 2023-07-12 | End: 2023-07-17

## 2023-07-12 ASSESSMENT — ENCOUNTER SYMPTOMS
DIZZINESS: 0
CHILLS: 0
SWOLLEN GLANDS: 0
PALPITATIONS: 0
CONSTIPATION: 0
WHEEZING: 0
NUMBNESS: 0
COUGH: 1
SINUS PRESSURE: 0
CHEST TIGHTNESS: 0
ADENOPATHY: 0
HEMATURIA: 0
HEADACHES: 0
NAUSEA: 0
SHORTNESS OF BREATH: 1
DIAPHORESIS: 0
DYSPHORIC MOOD: 0
SORE THROAT: 1
FREQUENCY: 0
DIARRHEA: 0
NERVOUS/ANXIOUS: 0
POLYDIPSIA: 0
POLYPHAGIA: 0
FEVER: 0
ABDOMINAL PAIN: 0
CONFUSION: 0
TROUBLE SWALLOWING: 1
HOARSE VOICE: 0
DYSURIA: 0
UNEXPECTED WEIGHT CHANGE: 0
SINUS PAIN: 0
LIGHT-HEADEDNESS: 0
VOMITING: 0

## 2023-07-12 ASSESSMENT — PATIENT HEALTH QUESTIONNAIRE - PHQ9
SUM OF ALL RESPONSES TO PHQ9 QUESTIONS 1 AND 2: 0
1. LITTLE INTEREST OR PLEASURE IN DOING THINGS: NOT AT ALL
2. FEELING DOWN, DEPRESSED OR HOPELESS: NOT AT ALL

## 2023-07-12 NOTE — PROGRESS NOTES
Subjective   Patient ID: Yolis Garza is a 63 y.o. female who presents for sore throat, cough, ear pain, runny nose x 7 days.    Sore Throat   The current episode started in the past 7 days. The problem has been gradually worsening. Neither side of throat is experiencing more pain than the other. There has been no fever. The pain is at a severity of 6/10. Associated symptoms include congestion, coughing (worse in the morning; productive of phlegm), ear pain, a plugged ear sensation, shortness of breath and trouble swallowing. Pertinent negatives include no abdominal pain, diarrhea, drooling, headaches, hoarse voice, swollen glands or vomiting. She has had no exposure to strep or mono. She has tried acetaminophen for the symptoms. The treatment provided moderate relief.   She is a non-smoker. She is exposed to smoke through her .      Review of Systems   Constitutional:  Negative for chills, diaphoresis, fever and unexpected weight change.   HENT:  Positive for congestion, ear pain, sore throat and trouble swallowing. Negative for drooling, hoarse voice, sinus pressure, sinus pain and sneezing.    Respiratory:  Positive for cough (worse in the morning; productive of phlegm) and shortness of breath. Negative for chest tightness and wheezing.    Cardiovascular:  Negative for chest pain, palpitations and leg swelling.   Gastrointestinal:  Negative for abdominal pain, constipation, diarrhea, nausea and vomiting.   Endocrine: Negative for cold intolerance, heat intolerance, polydipsia, polyphagia and polyuria.   Genitourinary:  Negative for dysuria, frequency, hematuria and urgency.   Neurological:  Negative for dizziness, syncope, light-headedness, numbness and headaches.   Hematological:  Negative for adenopathy.   Psychiatric/Behavioral:  Negative for confusion and dysphoric mood. The patient is not nervous/anxious.        Objective   /80 (BP Location: Left arm, Patient Position: Sitting, BP Cuff Size:  "Adult)   Pulse 72   Temp 36.7 °C (98.1 °F)   Resp 16   Ht 1.727 m (5' 8\")   Wt 70.8 kg (156 lb)   SpO2 96%   BMI 23.72 kg/m²     Physical Exam  Vitals and nursing note reviewed.   Constitutional:       General: She is not in acute distress.     Appearance: Normal appearance.   HENT:      Head: Normocephalic and atraumatic.      Right Ear: Hearing and ear canal normal. Tympanic membrane is erythematous.      Left Ear: Hearing and ear canal normal. Tympanic membrane is erythematous.      Nose: Nose normal.      Mouth/Throat:      Lips: Pink.      Mouth: Mucous membranes are moist.      Pharynx: Posterior oropharyngeal erythema present. No oropharyngeal exudate.   Eyes:      Extraocular Movements: Extraocular movements intact.      Conjunctiva/sclera: Conjunctivae normal.      Pupils: Pupils are equal, round, and reactive to light.   Cardiovascular:      Rate and Rhythm: Normal rate and regular rhythm.      Heart sounds: No murmur heard.     No friction rub. No gallop.   Pulmonary:      Effort: Pulmonary effort is normal.      Breath sounds: Normal breath sounds. No wheezing, rhonchi or rales.   Abdominal:      General: Bowel sounds are normal. There is no distension.      Palpations: Abdomen is soft.      Tenderness: There is no abdominal tenderness.   Musculoskeletal:         General: Normal range of motion.      Cervical back: Normal range of motion and neck supple.   Skin:     General: Skin is warm and dry.   Neurological:      General: No focal deficit present.      Mental Status: She is alert and oriented to person, place, and time.      Deep Tendon Reflexes: Reflexes normal.   Psychiatric:         Mood and Affect: Mood normal.         Behavior: Behavior normal.         Thought Content: Thought content normal.         Judgment: Judgment normal.         Assessment/Plan   Problem List Items Addressed This Visit       Acute pharyngitis - Primary     - start taryn-araceli as directed  - check COVID-19 and send for " strep culture  - Call if symptoms worsen or persist.           Relevant Medications    azithromycin (Zithromax) 250 mg tablet    Bilateral non-suppurative otitis media     - start z-park as directed  - check COVID-19 and send for strep culture  - Call if symptoms worsen or persist.           Relevant Medications    azithromycin (Zithromax) 250 mg tablet    Lumbosacral radiculitis    Relevant Medications    cyclobenzaprine (Flexeril) 5 mg tablet     Other Visit Diagnoses       Sore throat        Relevant Orders    POCT rapid strep A manually resulted (Completed)    Sars-CoV-2 PCR, Symptomatic    Group A Streptococcus, Culture    Acute cough        Relevant Orders    Sars-CoV-2 PCR, Symptomatic    Runny nose        Relevant Orders    Sars-CoV-2 PCR, Symptomatic

## 2023-07-12 NOTE — PATIENT INSTRUCTIONS
Yolis Garza ,    Thank you for coming in today. We at [facility] appreciate your trust in our care. If you have any questions or concerns about the care you received today, please do not hesitate to contact us at [phone].    The following instructions were discussed today:    - start zithromax --> 2 pills on day 1 and 1 pill on days 2-5   - Call if symptoms worsen or persist.    - Follow up 12/5/2023 as scheduled.

## 2023-07-12 NOTE — ASSESSMENT & PLAN NOTE
- start z-park as directed  - check COVID-19 and send for strep culture  - Call if symptoms worsen or persist.

## 2023-07-13 LAB — SARS-COV-2 RESULT: NOT DETECTED

## 2023-07-15 LAB — GROUP A STREP SCREEN, CULTURE: NORMAL

## 2023-07-17 ENCOUNTER — TELEPHONE (OUTPATIENT)
Dept: PRIMARY CARE | Facility: CLINIC | Age: 63
End: 2023-07-17
Payer: MEDICARE

## 2023-07-31 DIAGNOSIS — I10 ESSENTIAL HYPERTENSION: ICD-10-CM

## 2023-08-01 RX ORDER — LOSARTAN POTASSIUM 50 MG/1
50 TABLET ORAL DAILY
Qty: 90 TABLET | Refills: 1 | Status: SHIPPED | OUTPATIENT
Start: 2023-08-01 | End: 2024-01-28

## 2023-12-05 ENCOUNTER — APPOINTMENT (OUTPATIENT)
Dept: PRIMARY CARE | Facility: CLINIC | Age: 63
End: 2023-12-05
Payer: MEDICARE

## 2023-12-13 ENCOUNTER — OFFICE VISIT (OUTPATIENT)
Dept: PRIMARY CARE CLINIC | Age: 63
End: 2023-12-13
Payer: MEDICARE

## 2023-12-13 VITALS
RESPIRATION RATE: 18 BRPM | WEIGHT: 153 LBS | BODY MASS INDEX: 23.19 KG/M2 | SYSTOLIC BLOOD PRESSURE: 112 MMHG | DIASTOLIC BLOOD PRESSURE: 76 MMHG | HEART RATE: 75 BPM | HEIGHT: 68 IN | TEMPERATURE: 97.1 F | OXYGEN SATURATION: 99 %

## 2023-12-13 DIAGNOSIS — R00.2 PALPITATIONS: ICD-10-CM

## 2023-12-13 DIAGNOSIS — F51.01 PRIMARY INSOMNIA: Primary | ICD-10-CM

## 2023-12-13 DIAGNOSIS — N18.31 CHRONIC KIDNEY DISEASE, STAGE 3A (HCC): ICD-10-CM

## 2023-12-13 DIAGNOSIS — E78.2 MIXED HYPERLIPIDEMIA: ICD-10-CM

## 2023-12-13 DIAGNOSIS — Z76.89 ESTABLISHING CARE WITH NEW DOCTOR, ENCOUNTER FOR: ICD-10-CM

## 2023-12-13 PROBLEM — F41.1 GENERALIZED ANXIETY DISORDER: Status: ACTIVE | Noted: 2023-12-13

## 2023-12-13 PROBLEM — J02.9 ACUTE PHARYNGITIS: Status: ACTIVE | Noted: 2023-07-12

## 2023-12-13 PROBLEM — I10 ESSENTIAL HYPERTENSION: Status: ACTIVE | Noted: 2020-10-30

## 2023-12-13 PROBLEM — R61 HYPERHIDROSIS: Status: ACTIVE | Noted: 2023-12-13

## 2023-12-13 PROBLEM — F40.10 SOCIAL ANXIETY DISORDER: Status: ACTIVE | Noted: 2023-06-05

## 2023-12-13 PROBLEM — F12.90 MARIJUANA USE: Status: ACTIVE | Noted: 2023-06-05

## 2023-12-13 PROBLEM — H65.93 BILATERAL NON-SUPPURATIVE OTITIS MEDIA: Status: ACTIVE | Noted: 2023-07-12

## 2023-12-13 PROBLEM — R80.9 PROTEINURIA: Chronic | Status: ACTIVE | Noted: 2023-05-08

## 2023-12-13 PROBLEM — R19.5 POSITIVE COLORECTAL CANCER SCREENING USING COLOGUARD TEST: Status: ACTIVE | Noted: 2023-06-05

## 2023-12-13 PROBLEM — I12.9 BENIGN HYPERTENSIVE KIDNEY DISEASE WITH CHRONIC KIDNEY DISEASE: Status: ACTIVE | Noted: 2023-02-17

## 2023-12-13 LAB
ALBUMIN SERPL-MCNC: 4.4 G/DL (ref 3.5–5.2)
ALP BLD-CCNC: 62 U/L (ref 35–104)
ALT SERPL-CCNC: 15 U/L (ref 0–32)
ANION GAP SERPL CALCULATED.3IONS-SCNC: 18 MMOL/L (ref 7–16)
AST SERPL-CCNC: 22 U/L (ref 0–31)
BILIRUB SERPL-MCNC: 0.4 MG/DL (ref 0–1.2)
BUN BLDV-MCNC: 16 MG/DL (ref 6–23)
CALCIUM SERPL-MCNC: 9.9 MG/DL (ref 8.6–10.2)
CHLORIDE BLD-SCNC: 100 MMOL/L (ref 98–107)
CHOLESTEROL: 214 MG/DL
CO2: 20 MMOL/L (ref 22–29)
CREAT SERPL-MCNC: 1.1 MG/DL (ref 0.5–1)
GFR SERPL CREATININE-BSD FRML MDRD: 58 ML/MIN/1.73M2
GLUCOSE BLD-MCNC: 93 MG/DL (ref 74–99)
HCT VFR BLD CALC: 38 % (ref 34–48)
HDLC SERPL-MCNC: 83 MG/DL
HEMOGLOBIN: 13 G/DL (ref 11.5–15.5)
LDL CHOLESTEROL: 115 MG/DL
MCH RBC QN AUTO: 30.4 PG (ref 26–35)
MCHC RBC AUTO-ENTMCNC: 34.2 G/DL (ref 32–34.5)
MCV RBC AUTO: 88.8 FL (ref 80–99.9)
PDW BLD-RTO: 13 % (ref 11.5–15)
PLATELET # BLD: 260 K/UL (ref 130–450)
PMV BLD AUTO: 9.5 FL (ref 7–12)
POTASSIUM SERPL-SCNC: 3.7 MMOL/L (ref 3.5–5)
RBC # BLD: 4.28 M/UL (ref 3.5–5.5)
SODIUM BLD-SCNC: 138 MMOL/L (ref 132–146)
TOTAL PROTEIN: 7.1 G/DL (ref 6.4–8.3)
TRIGL SERPL-MCNC: 78 MG/DL
VLDLC SERPL CALC-MCNC: 16 MG/DL
WBC # BLD: 5.8 K/UL (ref 4.5–11.5)

## 2023-12-13 PROCEDURE — 99204 OFFICE O/P NEW MOD 45 MIN: CPT | Performed by: FAMILY MEDICINE

## 2023-12-13 PROCEDURE — 1123F ACP DISCUSS/DSCN MKR DOCD: CPT | Performed by: FAMILY MEDICINE

## 2023-12-13 PROCEDURE — 3078F DIAST BP <80 MM HG: CPT | Performed by: FAMILY MEDICINE

## 2023-12-13 PROCEDURE — 3074F SYST BP LT 130 MM HG: CPT | Performed by: FAMILY MEDICINE

## 2023-12-13 PROCEDURE — 36415 COLL VENOUS BLD VENIPUNCTURE: CPT | Performed by: FAMILY MEDICINE

## 2023-12-13 RX ORDER — PREDNISONE 20 MG/1
TABLET ORAL
COMMUNITY
Start: 2023-01-19 | End: 2023-12-13

## 2023-12-13 RX ORDER — PRAVASTATIN SODIUM 40 MG
40 TABLET ORAL DAILY
Qty: 30 TABLET | Status: CANCELLED | OUTPATIENT
Start: 2023-12-13

## 2023-12-13 RX ORDER — HYDROXYZINE HYDROCHLORIDE 25 MG/1
25 TABLET, FILM COATED ORAL EVERY 8 HOURS PRN
Qty: 90 TABLET | Refills: 1 | Status: SHIPPED | OUTPATIENT
Start: 2023-12-13 | End: 2024-03-12

## 2023-12-13 RX ORDER — PSEUDOEPHEDRINE HCL 30 MG
300 TABLET ORAL DAILY
COMMUNITY

## 2023-12-13 RX ORDER — PROPRANOLOL HYDROCHLORIDE 20 MG/1
20 TABLET ORAL 2 TIMES DAILY
Qty: 180 TABLET | Refills: 1 | Status: SHIPPED | OUTPATIENT
Start: 2023-12-13

## 2023-12-13 SDOH — ECONOMIC STABILITY: FOOD INSECURITY: WITHIN THE PAST 12 MONTHS, YOU WORRIED THAT YOUR FOOD WOULD RUN OUT BEFORE YOU GOT MONEY TO BUY MORE.: NEVER TRUE

## 2023-12-13 SDOH — ECONOMIC STABILITY: FOOD INSECURITY: WITHIN THE PAST 12 MONTHS, THE FOOD YOU BOUGHT JUST DIDN'T LAST AND YOU DIDN'T HAVE MONEY TO GET MORE.: NEVER TRUE

## 2023-12-13 SDOH — ECONOMIC STABILITY: HOUSING INSECURITY
IN THE LAST 12 MONTHS, WAS THERE A TIME WHEN YOU DID NOT HAVE A STEADY PLACE TO SLEEP OR SLEPT IN A SHELTER (INCLUDING NOW)?: NO

## 2023-12-13 SDOH — ECONOMIC STABILITY: INCOME INSECURITY: HOW HARD IS IT FOR YOU TO PAY FOR THE VERY BASICS LIKE FOOD, HOUSING, MEDICAL CARE, AND HEATING?: NOT HARD AT ALL

## 2023-12-13 ASSESSMENT — PATIENT HEALTH QUESTIONNAIRE - PHQ9
SUM OF ALL RESPONSES TO PHQ QUESTIONS 1-9: 0
SUM OF ALL RESPONSES TO PHQ9 QUESTIONS 1 & 2: 0
SUM OF ALL RESPONSES TO PHQ QUESTIONS 1-9: 0
SUM OF ALL RESPONSES TO PHQ QUESTIONS 1-9: 0
2. FEELING DOWN, DEPRESSED OR HOPELESS: 0
SUM OF ALL RESPONSES TO PHQ QUESTIONS 1-9: 0
1. LITTLE INTEREST OR PLEASURE IN DOING THINGS: 0

## 2023-12-13 NOTE — PROGRESS NOTES
[facility]  Family Medicine      Patient:  Mary Bui 70 y.o. female  Date of Service: 12/13/23      Chief complaint:   Chief Complaint   Patient presents with    New Patient    Medication Refill         History of Present Illness   Mary Bui is a 70 y.o. female who presents to the clinic with complaints as above.     Establish Care with New Physician  Medical History discussed and updated in chart  Surgical History discussed and updated in chart  Social History discussed and updated in chart  Medications reviewed and updated in chart as appropriate  Dr. Bari Wilkins for nephrology  Clonidine for hyperhydrosis  See below for Acute/Chronic conditions addressed today     Insomnia  Hx anxiety  Has great difficulty sleeping  Has been taking 25mg flexeril at night to sleep  Back pain is improving, doesn't keep her up at night, the flexeril just helps her sleep  Sometimes uses marijuana to help her sleep, which does help  Chronic sleep issue, even back to her 20s  Trazodone didn't help in the past, made her feel funny  Discussed R/B/A to atarax, she is agreeable  Denies fever, chills, chest pain, shortness of breath, abdominal pain, nausea, vomiting, diarrhea, numbness, tingling, loss of bowel or bladder control     Current Health Maintenance:  Health Maintenance   Topic Date Due    Lipids  Never done    Depression Screen  Never done    HIV screen  Never done    Hepatitis C screen  Never done    Cervical cancer screen  Never done    Colorectal Cancer Screen  Never done    Breast cancer screen  Never done    DEXA (modify frequency per FRAX score)  Never done    Respiratory Syncytial Virus (RSV) age 61 yrs+ (1 - 1-dose 60+ series) Never done    Annual Wellness Visit (AWV)  Never done    Flu vaccine (1) 08/01/2023    COVID-19 Vaccine (6 - 2023-24 season) 09/01/2023    DTaP/Tdap/Td vaccine (2 - Td or Tdap) 06/05/2027    Shingles vaccine  Completed    Pneumococcal 65+ years Vaccine  Completed

## 2023-12-27 DIAGNOSIS — F51.01 PRIMARY INSOMNIA: ICD-10-CM

## 2023-12-27 RX ORDER — HYDROXYZINE HYDROCHLORIDE 25 MG/1
25 TABLET, FILM COATED ORAL EVERY 8 HOURS PRN
Qty: 90 TABLET | Refills: 1 | Status: SHIPPED | OUTPATIENT
Start: 2023-12-27 | End: 2024-03-26

## 2023-12-27 NOTE — TELEPHONE ENCOUNTER
Last Appointment   12/13/2023  Next Appointment  1/16/2024      Pt called and requested this medication be sent to [facility]. It was originally sent to [facility] and it isn't on their formulary. [facility] states that she will be able to fill it there.      Thank You

## 2024-01-12 DIAGNOSIS — I10 ESSENTIAL HYPERTENSION: ICD-10-CM

## 2024-01-12 RX ORDER — LOSARTAN POTASSIUM 50 MG/1
50 TABLET ORAL DAILY
Qty: 90 TABLET | Refills: 3 | OUTPATIENT
Start: 2024-01-12

## 2024-01-16 ENCOUNTER — OFFICE VISIT (OUTPATIENT)
Dept: PRIMARY CARE CLINIC | Age: 64
End: 2024-01-16
Payer: MEDICARE

## 2024-01-16 VITALS
SYSTOLIC BLOOD PRESSURE: 101 MMHG | DIASTOLIC BLOOD PRESSURE: 67 MMHG | TEMPERATURE: 97.3 F | HEART RATE: 89 BPM | RESPIRATION RATE: 16 BRPM | BODY MASS INDEX: 23.86 KG/M2 | HEIGHT: 68 IN | WEIGHT: 157.4 LBS | OXYGEN SATURATION: 99 %

## 2024-01-16 DIAGNOSIS — F51.01 PRIMARY INSOMNIA: Primary | ICD-10-CM

## 2024-01-16 DIAGNOSIS — Z12.31 ENCOUNTER FOR SCREENING MAMMOGRAM FOR MALIGNANT NEOPLASM OF BREAST: ICD-10-CM

## 2024-01-16 PROCEDURE — 3078F DIAST BP <80 MM HG: CPT | Performed by: FAMILY MEDICINE

## 2024-01-16 PROCEDURE — 1123F ACP DISCUSS/DSCN MKR DOCD: CPT | Performed by: FAMILY MEDICINE

## 2024-01-16 PROCEDURE — 3074F SYST BP LT 130 MM HG: CPT | Performed by: FAMILY MEDICINE

## 2024-01-16 PROCEDURE — 99213 OFFICE O/P EST LOW 20 MIN: CPT | Performed by: FAMILY MEDICINE

## 2024-01-16 RX ORDER — ZOLPIDEM TARTRATE 5 MG/1
5 TABLET ORAL NIGHTLY PRN
Qty: 30 TABLET | Refills: 0 | Status: SHIPPED | OUTPATIENT
Start: 2024-01-16 | End: 2024-02-15

## 2024-01-16 RX ORDER — ASPIRIN 81 MG/1
1 TABLET ORAL DAILY
COMMUNITY
Start: 2022-10-20

## 2024-01-16 ASSESSMENT — PATIENT HEALTH QUESTIONNAIRE - PHQ9
2. FEELING DOWN, DEPRESSED OR HOPELESS: 0
SUM OF ALL RESPONSES TO PHQ9 QUESTIONS 1 & 2: 0
SUM OF ALL RESPONSES TO PHQ QUESTIONS 1-9: 0
1. LITTLE INTEREST OR PLEASURE IN DOING THINGS: 0
SUM OF ALL RESPONSES TO PHQ QUESTIONS 1-9: 0

## 2024-01-16 NOTE — PROGRESS NOTES
[facility]  Family Medicine      Patient:  Patti Weeks 64 y.o. female  Date of Service: 1/16/24      Chief complaint:   Chief Complaint   Patient presents with    Follow-up     Insomnia          History of Present Illness   Patti Weeks is a 64 y.o. female who presents to the clinic with complaints as above.    Insomnia Follow Up  Atarax didn't work and caused a rash, Trazodone didn't help in the past  Long hx insomnia, since childhood  Hx sleep walking  Usually cannot fall asleep until very early in the morning, sleeps only a few hours  Daytime fatigue  Denies fever, chills, chest pain, shortness of breath, abdominal pain, nausea, vomiting, diarrhea, numbness, tingling, loss of bowel or bladder control     Current Health Maintenance:  Health Maintenance   Topic Date Due    Hepatitis C screen  Never done    Colorectal Cancer Screen  Never done    Breast cancer screen  Never done    DEXA (modify frequency per FRAX score)  Never done    Respiratory Syncytial Virus (RSV) Pregnant or age 60 yrs+ (1 - 1-dose 60+ series) Never done    Flu vaccine (1) 08/01/2023    COVID-19 Vaccine (5 - 2023-24 season) 09/01/2023    Annual Wellness Visit (Medicare Advantage)  Never done    Lipids  12/13/2024    Depression Screen  12/13/2024    GFR test (Diabetes, CKD 3-4, OR last GFR 15-59)  12/13/2024    DTaP/Tdap/Td vaccine (2 - Td or Tdap) 06/05/2027    Shingles vaccine  Completed    Pneumococcal 65+ years Vaccine  Completed    Hepatitis A vaccine  Aged Out    Hepatitis B vaccine  Aged Out    Hib vaccine  Aged Out    Polio vaccine  Aged Out    Meningococcal (ACWY) vaccine  Aged Out    Pneumococcal 0-64 years Vaccine  Discontinued       Past Medical History:      Diagnosis Date    Anxiety     Axillary hyperhidrosis     CKD (chronic kidney disease)     HTN (hypertension)     Hyperlipidemia     Palpitations        Past Surgical History:        Procedure Laterality Date    PAIN MANAGEMENT PROCEDURE Left 3/2/2023

## 2024-02-13 ENCOUNTER — OFFICE VISIT (OUTPATIENT)
Dept: PRIMARY CARE CLINIC | Age: 64
End: 2024-02-13
Payer: MEDICARE

## 2024-02-13 VITALS
HEIGHT: 68 IN | BODY MASS INDEX: 23.76 KG/M2 | HEART RATE: 75 BPM | SYSTOLIC BLOOD PRESSURE: 97 MMHG | TEMPERATURE: 97.8 F | DIASTOLIC BLOOD PRESSURE: 61 MMHG | RESPIRATION RATE: 16 BRPM | OXYGEN SATURATION: 97 % | WEIGHT: 156.8 LBS

## 2024-02-13 DIAGNOSIS — Z76.0 MEDICATION REFILL: ICD-10-CM

## 2024-02-13 DIAGNOSIS — F51.01 PRIMARY INSOMNIA: Primary | ICD-10-CM

## 2024-02-13 PROCEDURE — 3078F DIAST BP <80 MM HG: CPT | Performed by: FAMILY MEDICINE

## 2024-02-13 PROCEDURE — 99213 OFFICE O/P EST LOW 20 MIN: CPT | Performed by: FAMILY MEDICINE

## 2024-02-13 PROCEDURE — 1123F ACP DISCUSS/DSCN MKR DOCD: CPT | Performed by: FAMILY MEDICINE

## 2024-02-13 PROCEDURE — 3074F SYST BP LT 130 MM HG: CPT | Performed by: FAMILY MEDICINE

## 2024-02-13 RX ORDER — PRAVASTATIN SODIUM 40 MG
40 TABLET ORAL DAILY
Qty: 90 TABLET | Refills: 1 | Status: SHIPPED | OUTPATIENT
Start: 2024-02-13

## 2024-02-13 RX ORDER — LOSARTAN POTASSIUM 50 MG/1
50 TABLET ORAL DAILY
Qty: 90 TABLET | Refills: 1 | Status: SHIPPED | OUTPATIENT
Start: 2024-02-13

## 2024-02-13 RX ORDER — TRIAMTERENE AND HYDROCHLOROTHIAZIDE 37.5; 25 MG/1; MG/1
1 CAPSULE ORAL EVERY MORNING
Qty: 45 CAPSULE | Refills: 1 | Status: SHIPPED
Start: 2024-02-13 | End: 2024-02-15 | Stop reason: DRUGHIGH

## 2024-02-13 RX ORDER — CYCLOBENZAPRINE HCL 5 MG
5 TABLET ORAL 3 TIMES DAILY PRN
Qty: 90 TABLET | Refills: 1 | Status: SHIPPED | OUTPATIENT
Start: 2024-02-13 | End: 2024-04-13

## 2024-02-13 NOTE — PROGRESS NOTES
Surgical History:        Procedure Laterality Date    PAIN MANAGEMENT PROCEDURE Left 3/2/2023    LEFT L4-5 TRANSFORAMINAL EPIDURAL STEROID INJECTION performed by Kristy Christiansen DO at [facility] OR    PAIN MANAGEMENT PROCEDURE Left 3/16/2023    LEFT  L4-5 TRANSFORAMINAL  EPIDURAL STEROID INJECTION performed by Kristy Christiansen DO at [facility] OR       Allergies:    Bee venom, Avelox [moxifloxacin], and Penicillins    Social History:   Social History     Socioeconomic History    Marital status:      Spouse name: Not on file    Number of children: Not on file    Years of education: Not on file    Highest education level: Not on file   Occupational History    Not on file   Tobacco Use    Smoking status: Never     Passive exposure: Current    Smokeless tobacco: Never   Substance and Sexual Activity    Alcohol use: Never    Drug use: Yes     Types: Marijuana (Weed)     Comment: at night    Sexual activity: Not on file   Other Topics Concern    Not on file   Social History Narrative    Not on file     Social Determinants of Health     Financial Resource Strain: Low Risk  (12/13/2023)    Overall Financial Resource Strain (CARDIA)     Difficulty of Paying Living Expenses: Not hard at all   Food Insecurity: No Food Insecurity (12/13/2023)    Hunger Vital Sign     Worried About Running Out of Food in the Last Year: Never true     Ran Out of Food in the Last Year: Never true   Transportation Needs: Unknown (12/13/2023)    PRAPARE - Transportation     Lack of Transportation (Medical): Not on file     Lack of Transportation (Non-Medical): No   Physical Activity: Not on file   Stress: Not on file   Social Connections: Not on file   Intimate Partner Violence: Not on file   Housing Stability: Unknown (12/13/2023)    Housing Stability Vital Sign     Unable to Pay for Housing in the Last Year: Not on file     Number of Places Lived in the Last Year: Not on file     Unstable Housing in the Last Year: No     DC instructions

## 2024-02-15 RX ORDER — TRIAMTERENE AND HYDROCHLOROTHIAZIDE 37.5; 25 MG/1; MG/1
0.5 TABLET ORAL DAILY
Qty: 45 TABLET | Refills: 1 | Status: SHIPPED | OUTPATIENT
Start: 2024-02-15

## 2024-05-14 ENCOUNTER — OFFICE VISIT (OUTPATIENT)
Dept: PRIMARY CARE CLINIC | Age: 64
End: 2024-05-14
Payer: MEDICARE

## 2024-05-14 VITALS
TEMPERATURE: 96.8 F | WEIGHT: 157.8 LBS | BODY MASS INDEX: 23.92 KG/M2 | SYSTOLIC BLOOD PRESSURE: 102 MMHG | HEART RATE: 66 BPM | DIASTOLIC BLOOD PRESSURE: 62 MMHG | HEIGHT: 68 IN | OXYGEN SATURATION: 98 %

## 2024-05-14 DIAGNOSIS — N18.31 CHRONIC KIDNEY DISEASE, STAGE 3A (HCC): ICD-10-CM

## 2024-05-14 DIAGNOSIS — F51.01 PRIMARY INSOMNIA: Primary | ICD-10-CM

## 2024-05-14 LAB
ANION GAP SERPL CALCULATED.3IONS-SCNC: 8 MMOL/L (ref 7–16)
BUN BLDV-MCNC: 15 MG/DL (ref 6–23)
CALCIUM SERPL-MCNC: 9.8 MG/DL (ref 8.6–10.2)
CHLORIDE BLD-SCNC: 101 MMOL/L (ref 98–107)
CO2: 27 MMOL/L (ref 22–29)
CREAT SERPL-MCNC: 1 MG/DL (ref 0.5–1)
GFR, ESTIMATED: 60 ML/MIN/1.73M2
GLUCOSE BLD-MCNC: 93 MG/DL (ref 74–99)
POTASSIUM SERPL-SCNC: 4.1 MMOL/L (ref 3.5–5)
SODIUM BLD-SCNC: 136 MMOL/L (ref 132–146)

## 2024-05-14 PROCEDURE — 36415 COLL VENOUS BLD VENIPUNCTURE: CPT | Performed by: FAMILY MEDICINE

## 2024-05-14 PROCEDURE — 99213 OFFICE O/P EST LOW 20 MIN: CPT | Performed by: FAMILY MEDICINE

## 2024-05-14 PROCEDURE — 3078F DIAST BP <80 MM HG: CPT | Performed by: FAMILY MEDICINE

## 2024-05-14 PROCEDURE — 1123F ACP DISCUSS/DSCN MKR DOCD: CPT | Performed by: FAMILY MEDICINE

## 2024-05-14 PROCEDURE — 3074F SYST BP LT 130 MM HG: CPT | Performed by: FAMILY MEDICINE
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
## 2024-06-05 LAB
BILIRUBIN, URINE: NEGATIVE
BLOOD, URINE: NEGATIVE
CLARITY: CLEAR
COLOR: YELLOW
CREATININE URINE: 82.8 MG/DL
GLUCOSE URINE: NEGATIVE
KETONES, URINE: NEGATIVE
LEUKOCYTE ESTERASE, URINE: NEGATIVE
MAGNESIUM: 2.1 MG/DL
MICROALBUMIN/CREAT 24H UR: <1.2 MG/DL
MICROALBUMIN/CREAT UR-RTO: NORMAL
NITRITE, URINE: NEGATIVE
PH UA: 7 (ref 4.5–8)
PHOSPHORUS: 3.9 MG/DL
PROTEIN UA: NEGATIVE
PTH INTACT: 63
SPECIFIC GRAVITY, URINE: 1.01
URIC ACID: 5.4
UROBILINOGEN, URINE: NORMAL
VITAMIN D 25-HYDROXY: 21

## 2024-06-07 LAB
BASOPHILS ABSOLUTE: 0.09 /ΜL
BASOPHILS RELATIVE PERCENT: 1.6 %
EOSINOPHILS ABSOLUTE: 0.19 /ΜL
EOSINOPHILS RELATIVE PERCENT: 3.3 %
HCT VFR BLD CALC: 36.2 % (ref 36–46)
HEMOGLOBIN: 12.7 G/DL (ref 12–16)
LYMPHOCYTES ABSOLUTE: 1.88 /ΜL
LYMPHOCYTES RELATIVE PERCENT: 32.9 %
MCH RBC QN AUTO: 29.8 PG
MCHC RBC AUTO-ENTMCNC: 35.1 G/DL
MCV RBC AUTO: 85 FL
MONOCYTES ABSOLUTE: 0.58 /ΜL
MONOCYTES RELATIVE PERCENT: 10.1 %
NEUTROPHILS ABSOLUTE: 2.97 /ΜL
NEUTROPHILS RELATIVE PERCENT: 51.9 %
PLATELET # BLD: 261 K/ΜL
PMV BLD AUTO: 9.4 FL
RBC # BLD: 4.26 10^6/ΜL
WBC # BLD: 5.7 10^3/ML

## 2024-07-13 DIAGNOSIS — Z76.0 MEDICATION REFILL: ICD-10-CM

## 2024-07-15 RX ORDER — LOSARTAN POTASSIUM 50 MG/1
50 TABLET ORAL DAILY
Qty: 90 TABLET | Refills: 3 | Status: SHIPPED | OUTPATIENT
Start: 2024-07-15

## 2024-07-15 NOTE — TELEPHONE ENCOUNTER
losartan (COZAAR) 50 MG tablet     Last Appointment:  5/14/2024  Future Appointments   Date Time Provider Department Center   7/18/2024  9:00 AM Joy Ramirez, APRN - [facility]

## 2024-08-01 DIAGNOSIS — Z76.0 MEDICATION REFILL: ICD-10-CM

## 2024-08-01 RX ORDER — PRAVASTATIN SODIUM 40 MG
40 TABLET ORAL DAILY
Qty: 90 TABLET | Refills: 1 | Status: SHIPPED | OUTPATIENT
Start: 2024-08-01

## 2024-08-01 NOTE — TELEPHONE ENCOUNTER
pravastatin (PRAVACHOL) 40 MG tablet     Last Appointment:  5/14/2024  No future appointments.     Component      Latest Ref Rng 12/13/2023   Cholesterol, Total      <200 mg/dL 214 (H)    HDL Cholesterol      >40 mg/dL 83    LDL Cholesterol      <100 mg/dL 115 (H)    Triglycerides      <150 mg/dL 78    VLDL      mg/dL 16

## 2024-08-12 DIAGNOSIS — Z76.0 MEDICATION REFILL: ICD-10-CM

## 2024-08-13 RX ORDER — CYCLOBENZAPRINE HCL 5 MG
5 TABLET ORAL 3 TIMES DAILY PRN
Qty: 90 TABLET | Refills: 1 | Status: SHIPPED | OUTPATIENT
Start: 2024-08-13 | End: 2024-10-12

## 2024-10-09 NOTE — TELEPHONE ENCOUNTER
Name of Medication(s) Requested:  Requested Prescriptions     Pending Prescriptions Disp Refills    triamterene-hydroCHLOROthiazide (MAXZIDE-25) 37.5-25 MG per tablet [Pharmacy Med Name: Triamterene-HCTZ 37.5-25 MG Oral Tablet] 45 tablet 3     Sig: TAKE ONE-HALF TABLET BY MOUTH  DAILY       Medication is on current medication list Yes    Dosage and directions were verified? Yes    Quantity verified: 90 day supply     Pharmacy Verified?  Yes    Last Appointment:  5/14/2024    Future appts:  No future appointments.     (If no appt send self scheduling link. .REFILLAPPT)  Scheduling request sent?     [] Yes  [x] No    Does patient need updated?  [] Yes  [x] No

## 2024-10-11 RX ORDER — TRIAMTERENE/HYDROCHLOROTHIAZID 37.5-25 MG
0.5 TABLET ORAL DAILY
Qty: 45 TABLET | Refills: 3 | Status: SHIPPED | OUTPATIENT
Start: 2024-10-11

## 2024-11-07 DIAGNOSIS — Z76.0 MEDICATION REFILL: ICD-10-CM

## 2024-11-07 RX ORDER — CYCLOBENZAPRINE HCL 5 MG
TABLET ORAL
Qty: 90 TABLET | Refills: 1 | Status: SHIPPED | OUTPATIENT
Start: 2024-11-07

## 2024-11-07 NOTE — TELEPHONE ENCOUNTER
cyclobenzaprine (FLEXERIL) 5 MG tablet     Last Appointment:  05/14/2024  No future appointments.

## 2024-12-05 NOTE — TELEPHONE ENCOUNTER
Name of Medication(s) Requested:  Requested Prescriptions     Pending Prescriptions Disp Refills    cloNIDine (CATAPRES) 0.1 MG tablet 60 tablet      Sig: Take 1 tablet by mouth daily 1/2 tablet daily       Medication is on current medication list Yes    Dosage and directions were verified? No    Quantity verified: 30 day supply     Pharmacy Verified?  Yes    Last Appointment:  5/14/2024    Future appts:  Future Appointments   Date Time Provider Department Center   1/21/2025 10:45 AM Melinda Joseph DO [facility]        (If no appt send self scheduling link. .REFILLAPPT)  Scheduling request sent?     [] Yes  [x] No    Does patient need updated?  [] Yes  [x] No

## 2024-12-06 RX ORDER — CLONIDINE HYDROCHLORIDE 0.1 MG/1
0.1 TABLET ORAL DAILY
Qty: 60 TABLET | Refills: 1 | Status: SHIPPED | OUTPATIENT
Start: 2024-12-06

## 2025-01-21 ENCOUNTER — OFFICE VISIT (OUTPATIENT)
Dept: PRIMARY CARE CLINIC | Age: 65
End: 2025-01-21
Payer: MEDICARE

## 2025-01-21 VITALS
TEMPERATURE: 97.5 F | HEART RATE: 57 BPM | DIASTOLIC BLOOD PRESSURE: 76 MMHG | SYSTOLIC BLOOD PRESSURE: 113 MMHG | HEIGHT: 68 IN | BODY MASS INDEX: 24.22 KG/M2 | OXYGEN SATURATION: 99 % | WEIGHT: 159.8 LBS

## 2025-01-21 DIAGNOSIS — N18.31 CHRONIC KIDNEY DISEASE, STAGE 3A (HCC): ICD-10-CM

## 2025-01-21 DIAGNOSIS — L21.9 SEBORRHEIC DERMATITIS: Primary | ICD-10-CM

## 2025-01-21 DIAGNOSIS — F51.01 PRIMARY INSOMNIA: ICD-10-CM

## 2025-01-21 LAB
ANION GAP SERPL CALCULATED.3IONS-SCNC: 11 MMOL/L (ref 7–16)
BUN BLDV-MCNC: 17 MG/DL (ref 6–23)
CALCIUM SERPL-MCNC: 9.8 MG/DL (ref 8.6–10.2)
CHLORIDE BLD-SCNC: 103 MMOL/L (ref 98–107)
CO2: 26 MMOL/L (ref 22–29)
CREAT SERPL-MCNC: 1.1 MG/DL (ref 0.5–1)
GFR, ESTIMATED: 57 ML/MIN/1.73M2
GLUCOSE BLD-MCNC: 91 MG/DL (ref 74–99)
POTASSIUM SERPL-SCNC: 4.1 MMOL/L (ref 3.5–5)
SODIUM BLD-SCNC: 140 MMOL/L (ref 132–146)

## 2025-01-21 PROCEDURE — 3078F DIAST BP <80 MM HG: CPT | Performed by: FAMILY MEDICINE

## 2025-01-21 PROCEDURE — 3017F COLORECTAL CA SCREEN DOC REV: CPT | Performed by: FAMILY MEDICINE

## 2025-01-21 PROCEDURE — 1159F MED LIST DOCD IN RCRD: CPT | Performed by: FAMILY MEDICINE

## 2025-01-21 PROCEDURE — 36415 COLL VENOUS BLD VENIPUNCTURE: CPT | Performed by: FAMILY MEDICINE

## 2025-01-21 PROCEDURE — 99214 OFFICE O/P EST MOD 30 MIN: CPT | Performed by: FAMILY MEDICINE

## 2025-01-21 PROCEDURE — 3074F SYST BP LT 130 MM HG: CPT | Performed by: FAMILY MEDICINE

## 2025-01-21 PROCEDURE — 1123F ACP DISCUSS/DSCN MKR DOCD: CPT | Performed by: FAMILY MEDICINE

## 2025-01-21 PROCEDURE — G8427 DOCREV CUR MEDS BY ELIG CLIN: HCPCS | Performed by: FAMILY MEDICINE

## 2025-01-21 PROCEDURE — G8420 CALC BMI NORM PARAMETERS: HCPCS | Performed by: FAMILY MEDICINE

## 2025-01-21 PROCEDURE — 1036F TOBACCO NON-USER: CPT | Performed by: FAMILY MEDICINE

## 2025-01-21 PROCEDURE — G8400 PT W/DXA NO RESULTS DOC: HCPCS | Performed by: FAMILY MEDICINE

## 2025-01-21 PROCEDURE — 1090F PRES/ABSN URINE INCON ASSESS: CPT | Performed by: FAMILY MEDICINE

## 2025-01-21 RX ORDER — HYDROCORTISONE 25 MG/G
CREAM TOPICAL
Qty: 20 G | Refills: 0 | Status: SHIPPED | OUTPATIENT
Start: 2025-01-21

## 2025-01-21 RX ORDER — CICLOPIROX 1 G/100ML
SHAMPOO TOPICAL
Qty: 120 ML | Refills: 0 | Status: SHIPPED | OUTPATIENT
Start: 2025-01-21

## 2025-01-21 RX ORDER — QUETIAPINE FUMARATE 25 MG/1
25 TABLET, FILM COATED ORAL NIGHTLY
Qty: 90 TABLET | Refills: 0 | Status: SHIPPED | OUTPATIENT
Start: 2025-01-21

## 2025-01-21 SDOH — ECONOMIC STABILITY: FOOD INSECURITY: WITHIN THE PAST 12 MONTHS, THE FOOD YOU BOUGHT JUST DIDN'T LAST AND YOU DIDN'T HAVE MONEY TO GET MORE.: NEVER TRUE

## 2025-01-21 SDOH — ECONOMIC STABILITY: FOOD INSECURITY: WITHIN THE PAST 12 MONTHS, YOU WORRIED THAT YOUR FOOD WOULD RUN OUT BEFORE YOU GOT MONEY TO BUY MORE.: NEVER TRUE

## 2025-01-21 ASSESSMENT — PATIENT HEALTH QUESTIONNAIRE - PHQ9
SUM OF ALL RESPONSES TO PHQ QUESTIONS 1-9: 0
1. LITTLE INTEREST OR PLEASURE IN DOING THINGS: NOT AT ALL
SUM OF ALL RESPONSES TO PHQ QUESTIONS 1-9: 0
2. FEELING DOWN, DEPRESSED OR HOPELESS: NOT AT ALL
SUM OF ALL RESPONSES TO PHQ9 QUESTIONS 1 & 2: 0

## 2025-01-21 NOTE — PROGRESS NOTES
[facility]  Family Medicine      Patient:  Patti Weeks 65 y.o. female  Date of Service: 1/21/25      Chief complaint:   Chief Complaint   Patient presents with    Follow-up    Skin Problem     Pt c/o face is itchy and burning.    Discuss Medications         History of Present Illness   Patti Weeks is a 65 y.o. female who presents to the clinic with complaints as above.    Skin Problem  Since September  Staying with a friend at that time, and it started then  Continues  Lidocaine helps  Water makes it itchy  Moisturizer helps some  Itchy, burning  Started on left side of face and head, now on both sides  She did try ketoconazole over-the-counter shampoo, however this caused painful burning on her face second time she used it    Insomnia  Uncontrolled  Patient was on Seroquel for this in the past, and it seemed to help  Hydroxyzine helps some  Difficulty falling asleep and staying asleep    Current Health Maintenance:  Health Maintenance   Topic Date Due    Hepatitis C screen  Never done    Breast cancer screen  Never done    DEXA (modify frequency per FRAX score)  Never done    Lipids  12/13/2024    Annual Wellness Visit (Medicare Advantage)  Never done    Depression Screen  01/16/2025    COVID-19 Vaccine (9 - 2023-24 season) 03/19/2025    GFR test (Diabetes, CKD 3-4, OR last GFR 15-59)  05/14/2025    Colorectal Cancer Screen  01/19/2026    DTaP/Tdap/Td vaccine (2 - Td or Tdap) 06/05/2027    Respiratory Syncytial Virus (RSV) Pregnant or age 60 yrs+ (1 - 1-dose 75+ series) 01/08/2033    Flu vaccine  Completed    Shingles vaccine  Completed    Pneumococcal 65+ years Vaccine  Completed    Hepatitis A vaccine  Aged Out    Hepatitis B vaccine  Aged Out    Hib vaccine  Aged Out    Polio vaccine  Aged Out    Meningococcal (ACWY) vaccine  Aged Out    Pneumococcal 0-64 years Vaccine  Discontinued       Past Medical History:      Diagnosis Date    Anxiety     Axillary hyperhidrosis     CKD (chronic

## 2025-02-01 DIAGNOSIS — Z76.0 MEDICATION REFILL: ICD-10-CM

## 2025-02-03 DIAGNOSIS — R00.2 PALPITATIONS: ICD-10-CM

## 2025-02-03 RX ORDER — PROPRANOLOL HCL 20 MG
10 TABLET ORAL DAILY
Qty: 90 TABLET | Refills: 3 | Status: SHIPPED | OUTPATIENT
Start: 2025-02-03

## 2025-02-03 RX ORDER — PRAVASTATIN SODIUM 40 MG
40 TABLET ORAL DAILY
Qty: 90 TABLET | Refills: 3 | Status: SHIPPED | OUTPATIENT
Start: 2025-02-03

## 2025-02-03 NOTE — TELEPHONE ENCOUNTER
Name of Medication(s) Requested:  Requested Prescriptions     Pending Prescriptions Disp Refills    pravastatin (PRAVACHOL) 40 MG tablet [Pharmacy Med Name: Pravastatin Sodium 40 MG Oral Tablet] 90 tablet 3     Sig: TAKE 1 TABLET BY MOUTH DAILY       Medication is on current medication list Yes    Dosage and directions were verified? Yes    Quantity verified: 90 day supply     Pharmacy Verified?  Yes    Last Appointment:  01/21/2025    Future appts:  Future Appointments   Date Time Provider Department Center   2/21/2025 10:15 AM Melinda Joseph DO [facility] ECC DEP        (If no appt send self scheduling link. .REFILLAPPT)  Scheduling request sent?     [] Yes  [x] No    Does patient need updated?  [] Yes  [x] No

## 2025-02-03 NOTE — TELEPHONE ENCOUNTER
Name of Medication(s) Requested:  Requested Prescriptions     Pending Prescriptions Disp Refills    propranolol (INDERAL) 20 MG tablet [Pharmacy Med Name: Propranolol HCl 20 MG Oral Tablet] 180 tablet 3     Sig: TAKE 1 TABLET BY MOUTH TWICE  DAILY       Medication is on current medication list Yes    Dosage and directions were verified? Yes    Quantity verified: 90 day supply     Pharmacy Verified?  Yes    Last Appointment:  1/21/2025    Future appts:  Future Appointments   Date Time Provider Department Center   2/21/2025 10:15 AM Melinda Joseph DO [facility] E[facility] DEP        (If no appt send self scheduling link. .REFILLAPPT)  Scheduling request sent?     [] Yes  [x] No    Does patient need updated?  [] Yes  [x] No

## 2025-02-21 ENCOUNTER — OFFICE VISIT (OUTPATIENT)
Dept: PRIMARY CARE CLINIC | Age: 65
End: 2025-02-21
Payer: MEDICARE

## 2025-02-21 VITALS
SYSTOLIC BLOOD PRESSURE: 104 MMHG | HEART RATE: 66 BPM | HEIGHT: 68 IN | BODY MASS INDEX: 24.61 KG/M2 | OXYGEN SATURATION: 99 % | DIASTOLIC BLOOD PRESSURE: 64 MMHG | TEMPERATURE: 97.3 F | WEIGHT: 162.4 LBS

## 2025-02-21 DIAGNOSIS — F51.01 PRIMARY INSOMNIA: Primary | ICD-10-CM

## 2025-02-21 DIAGNOSIS — L21.9 SEBORRHEIC DERMATITIS: ICD-10-CM

## 2025-02-21 PROCEDURE — 1090F PRES/ABSN URINE INCON ASSESS: CPT | Performed by: FAMILY MEDICINE

## 2025-02-21 PROCEDURE — 1160F RVW MEDS BY RX/DR IN RCRD: CPT | Performed by: FAMILY MEDICINE

## 2025-02-21 PROCEDURE — 3074F SYST BP LT 130 MM HG: CPT | Performed by: FAMILY MEDICINE

## 2025-02-21 PROCEDURE — 3017F COLORECTAL CA SCREEN DOC REV: CPT | Performed by: FAMILY MEDICINE

## 2025-02-21 PROCEDURE — 1159F MED LIST DOCD IN RCRD: CPT | Performed by: FAMILY MEDICINE

## 2025-02-21 PROCEDURE — 99214 OFFICE O/P EST MOD 30 MIN: CPT | Performed by: FAMILY MEDICINE

## 2025-02-21 PROCEDURE — 3078F DIAST BP <80 MM HG: CPT | Performed by: FAMILY MEDICINE

## 2025-02-21 PROCEDURE — G8420 CALC BMI NORM PARAMETERS: HCPCS | Performed by: FAMILY MEDICINE

## 2025-02-21 PROCEDURE — 1036F TOBACCO NON-USER: CPT | Performed by: FAMILY MEDICINE

## 2025-02-21 PROCEDURE — G8427 DOCREV CUR MEDS BY ELIG CLIN: HCPCS | Performed by: FAMILY MEDICINE

## 2025-02-21 PROCEDURE — G8400 PT W/DXA NO RESULTS DOC: HCPCS | Performed by: FAMILY MEDICINE

## 2025-02-21 PROCEDURE — 1123F ACP DISCUSS/DSCN MKR DOCD: CPT | Performed by: FAMILY MEDICINE

## 2025-02-21 RX ORDER — CLOTRIMAZOLE AND BETAMETHASONE DIPROPIONATE 10; .64 MG/G; MG/G
CREAM TOPICAL
Qty: 15 G | Refills: 0 | Status: SHIPPED | OUTPATIENT
Start: 2025-02-21

## 2025-02-21 ASSESSMENT — PATIENT HEALTH QUESTIONNAIRE - PHQ9
SUM OF ALL RESPONSES TO PHQ QUESTIONS 1-9: 0
1. LITTLE INTEREST OR PLEASURE IN DOING THINGS: NOT AT ALL
SUM OF ALL RESPONSES TO PHQ9 QUESTIONS 1 & 2: 0
SUM OF ALL RESPONSES TO PHQ QUESTIONS 1-9: 0
SUM OF ALL RESPONSES TO PHQ QUESTIONS 1-9: 0
2. FEELING DOWN, DEPRESSED OR HOPELESS: NOT AT ALL
SUM OF ALL RESPONSES TO PHQ QUESTIONS 1-9: 0

## 2025-03-29 DIAGNOSIS — F51.01 PRIMARY INSOMNIA: ICD-10-CM

## 2025-04-01 NOTE — TELEPHONE ENCOUNTER
Name of Medication(s) Requested:  Requested Prescriptions     Pending Prescriptions Disp Refills    QUEtiapine (SEROQUEL) 25 MG tablet [Pharmacy Med Name: QUEtiapine Fumarate 25 MG Oral Tablet] 90 tablet 3     Sig: TAKE 1 TABLET BY MOUTH AT  BEDTIME       Medication is on current medication list Yes    Dosage and directions were verified? Yes    Quantity verified: 90 day supply     Pharmacy Verified?  Yes    Last Appointment:  2/21/2025    Future appts:  Future Appointments   Date Time Provider Department Center   5/29/2025 10:00 AM Melinda Joseph DO [facility] DEP        (If no appt send self scheduling link. .REFILLAPPT)  Scheduling request sent?     [] Yes  [x] No    Does patient need updated?  [] Yes  [x] No

## 2025-04-03 RX ORDER — QUETIAPINE FUMARATE 25 MG/1
25 TABLET, FILM COATED ORAL NIGHTLY
Qty: 90 TABLET | Refills: 3 | Status: SHIPPED | OUTPATIENT
Start: 2025-04-03

## 2025-05-20 ENCOUNTER — TELEPHONE (OUTPATIENT)
Dept: MAMMOGRAPHY | Age: 65
End: 2025-05-20

## 2025-05-29 ENCOUNTER — OFFICE VISIT (OUTPATIENT)
Dept: PRIMARY CARE CLINIC | Age: 65
End: 2025-05-29
Payer: MEDICARE

## 2025-05-29 VITALS
HEIGHT: 68 IN | BODY MASS INDEX: 24.1 KG/M2 | TEMPERATURE: 96.7 F | SYSTOLIC BLOOD PRESSURE: 109 MMHG | OXYGEN SATURATION: 99 % | WEIGHT: 159 LBS | HEART RATE: 65 BPM | DIASTOLIC BLOOD PRESSURE: 68 MMHG

## 2025-05-29 DIAGNOSIS — Z12.31 ENCOUNTER FOR SCREENING MAMMOGRAM FOR MALIGNANT NEOPLASM OF BREAST: ICD-10-CM

## 2025-05-29 DIAGNOSIS — D69.6 THROMBOCYTOPENIA: ICD-10-CM

## 2025-05-29 DIAGNOSIS — N18.31 STAGE 3A CHRONIC KIDNEY DISEASE (HCC): ICD-10-CM

## 2025-05-29 DIAGNOSIS — M81.0 SENILE OSTEOPOROSIS: ICD-10-CM

## 2025-05-29 DIAGNOSIS — Z00.00 MEDICARE ANNUAL WELLNESS VISIT, SUBSEQUENT: Primary | ICD-10-CM

## 2025-05-29 PROCEDURE — 3078F DIAST BP <80 MM HG: CPT | Performed by: FAMILY MEDICINE

## 2025-05-29 PROCEDURE — 1123F ACP DISCUSS/DSCN MKR DOCD: CPT | Performed by: FAMILY MEDICINE

## 2025-05-29 PROCEDURE — G0439 PPPS, SUBSEQ VISIT: HCPCS | Performed by: FAMILY MEDICINE

## 2025-05-29 PROCEDURE — 3074F SYST BP LT 130 MM HG: CPT | Performed by: FAMILY MEDICINE

## 2025-05-29 PROCEDURE — 1159F MED LIST DOCD IN RCRD: CPT | Performed by: FAMILY MEDICINE

## 2025-05-29 PROCEDURE — 3017F COLORECTAL CA SCREEN DOC REV: CPT | Performed by: FAMILY MEDICINE

## 2025-05-29 PROCEDURE — 1160F RVW MEDS BY RX/DR IN RCRD: CPT | Performed by: FAMILY MEDICINE

## 2025-05-29 PROCEDURE — 36415 COLL VENOUS BLD VENIPUNCTURE: CPT | Performed by: FAMILY MEDICINE

## 2025-05-29 ASSESSMENT — LIFESTYLE VARIABLES
HOW MANY STANDARD DRINKS CONTAINING ALCOHOL DO YOU HAVE ON A TYPICAL DAY: PATIENT DOES NOT DRINK
HOW OFTEN DO YOU HAVE A DRINK CONTAINING ALCOHOL: NEVER

## 2025-05-29 ASSESSMENT — PATIENT HEALTH QUESTIONNAIRE - PHQ9
2. FEELING DOWN, DEPRESSED OR HOPELESS: SEVERAL DAYS
1. LITTLE INTEREST OR PLEASURE IN DOING THINGS: NOT AT ALL
SUM OF ALL RESPONSES TO PHQ QUESTIONS 1-9: 1
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
## 2025-05-29 NOTE — PATIENT INSTRUCTIONS
Advance Directives: Care Instructions  Overview  An advance directive is a legal way to state your wishes at the end of your life. It tells your family and your doctor what to do if you can't say what you want.  There are two main types of advance directives. You can change them any time your wishes change.  Living will.  This form tells your family and your doctor your wishes about life support and other treatment. The form is also called a declaration.  Medical power of .  This form lets you name a person to make treatment decisions for you when you can't speak for yourself. This person is called a health care agent (health care proxy, health care surrogate). The form is also called a durable power of  for health care.  If you do not have an advance directive, decisions about your medical care may be made by a family member, or by a doctor or a  who doesn't know you.  It may help to think of an advance directive as a gift to the people who care for you. If you have one, they won't have to make tough decisions by themselves.  For more information, including forms for your state, see the [employer] website (www.caringinfo.org/planning/advance-directives/).  Follow-up care is a key part of your treatment and safety. Be sure to make and go to all appointments, and call your doctor if you are having problems. It's also a good idea to know your test results and keep a list of the medicines you take.  What should you include in an advance directive?  Many states have a unique advance directive form. (It may ask you to address specific issues.) Or you might use a universal form that's approved by many states.  If your form doesn't tell you what to address, it may be hard to know what to include in your advance directive. Use the questions below to help you get started.  Who do you want to make decisions about your medical care if you are not able to?  What life-support measures do you want if you

## 2025-05-30 ENCOUNTER — RESULTS FOLLOW-UP (OUTPATIENT)
Dept: PRIMARY CARE CLINIC | Age: 65
End: 2025-05-30

## 2025-05-30 LAB
BASOPHILS ABSOLUTE: 0.08 K/UL (ref 0–0.2)
BASOPHILS RELATIVE PERCENT: 2 % (ref 0–2)
CHOLESTEROL, TOTAL: 210 MG/DL
EOSINOPHILS ABSOLUTE: 0.13 K/UL (ref 0.05–0.5)
EOSINOPHILS RELATIVE PERCENT: 2 % (ref 0–6)
FOLATE: 18.3 NG/ML (ref 4.6–34.8)
HCT VFR BLD CALC: 41.3 % (ref 34–48)
HDLC SERPL-MCNC: 79 MG/DL
HEMOGLOBIN: 13.4 G/DL (ref 11.5–15.5)
IMMATURE GRANULOCYTES %: 0 % (ref 0–5)
IMMATURE GRANULOCYTES ABSOLUTE: <0.03 K/UL (ref 0–0.58)
LDL CHOLESTEROL: 115 MG/DL
LYMPHOCYTES ABSOLUTE: 1.67 K/UL (ref 1.5–4)
LYMPHOCYTES RELATIVE PERCENT: 31 % (ref 20–42)
MCH RBC QN AUTO: 29.6 PG (ref 26–35)
MCHC RBC AUTO-ENTMCNC: 32.4 G/DL (ref 32–34.5)
MCV RBC AUTO: 91.2 FL (ref 80–99.9)
MONOCYTES ABSOLUTE: 0.47 K/UL (ref 0.1–0.95)
MONOCYTES RELATIVE PERCENT: 9 % (ref 2–12)
NEUTROPHILS ABSOLUTE: 3.02 K/UL (ref 1.8–7.3)
NEUTROPHILS RELATIVE PERCENT: 56 % (ref 43–80)
PDW BLD-RTO: 12.7 % (ref 11.5–15)
PLATELET # BLD: 258 K/UL (ref 130–450)
PMV BLD AUTO: 9.9 FL (ref 7–12)
RBC # BLD: 4.53 M/UL (ref 3.5–5.5)
TRIGL SERPL-MCNC: 82 MG/DL
VITAMIN B-12: 297 PG/ML (ref 232–1245)
VLDLC SERPL CALC-MCNC: 16 MG/DL
WBC # BLD: 5.4 K/UL (ref 4.5–11.5)

## 2025-07-11 DIAGNOSIS — Z76.0 MEDICATION REFILL: ICD-10-CM

## 2025-07-11 RX ORDER — LOSARTAN POTASSIUM 50 MG/1
50 TABLET ORAL DAILY
Qty: 90 TABLET | Refills: 3 | Status: SHIPPED | OUTPATIENT
Start: 2025-07-11

## 2025-07-11 NOTE — TELEPHONE ENCOUNTER
Name of Medication(s) Requested:  Requested Prescriptions     Pending Prescriptions Disp Refills    losartan (COZAAR) 50 MG tablet [Pharmacy Med Name: Losartan Potassium 50 MG Oral Tablet] 90 tablet 3     Sig: TAKE 1 TABLET BY MOUTH DAILY       Medication is on current medication list Yes    Dosage and directions were verified? Yes    Quantity verified: 90 day supply     Pharmacy Verified?  Yes    Last Appointment:  Visit date not found    Future appts:  Future Appointments   Date Time Provider Department Center   7/16/2025 12:30 PM [facility] MOBILE [facility] RM 1 SE MOBILE [facility] Rad/Car   11/28/2025  9:00 AM Melinda Joseph, DO [facility] DEP   6/1/2026  9:00 AM Melinda Joseph, DO [facility] DEP        (If no appt send self scheduling link. .REFILLAPPT)  Scheduling request sent?     [] Yes  [x] No    Does patient need updated?  [] Yes  [x] No

## 2025-07-16 ENCOUNTER — HOSPITAL ENCOUNTER (OUTPATIENT)
Dept: MAMMOGRAPHY | Age: 65
Discharge: HOME | End: 2025-07-18
Attending: FAMILY MEDICINE
Payer: MEDICARE

## 2025-07-16 VITALS — BODY MASS INDEX: 23.7 KG/M2 | WEIGHT: 160 LBS | HEIGHT: 69 IN

## 2025-07-16 DIAGNOSIS — Z00.00 MEDICARE ANNUAL WELLNESS VISIT, SUBSEQUENT: ICD-10-CM

## 2025-07-16 DIAGNOSIS — Z12.31 ENCOUNTER FOR SCREENING MAMMOGRAM FOR MALIGNANT NEOPLASM OF BREAST: ICD-10-CM

## 2025-07-16 PROCEDURE — 77063 BREAST TOMOSYNTHESIS BI: CPT

## (undated) DEVICE — NEEDLE HYPO 18GA L1.5IN PNK POLYPR HUB S STL THN WALL FILL

## (undated) DEVICE — TOWEL OR BLUEE 16X26IN ST 8 PACK ORB08 16X26ORTWL

## (undated) DEVICE — GAUZE,SPONGE,4"X4",12PLY,STERILE,LF,2'S: Brand: MEDLINE

## (undated) DEVICE — Z DISCONTINUED APPLICATOR SURG PREP 0.35OZ 2% CHG 70% ISO ALC W/ HI LT

## (undated) DEVICE — 3M™ RED DOT™ MONITORING ELECTRODE WITH FOAM TAPE AND STICKY GEL 2560, 50/BAG, 20/CASE, 72/PLT: Brand: RED DOT™

## (undated) DEVICE — 3 ML SYRINGE LUER-LOCK TIP: Brand: MONOJECT

## (undated) DEVICE — 6 ML SYRINGE LUER-LOCK TIP: Brand: MONOJECT

## (undated) DEVICE — NEEDLE HYPO 25GA L1.5IN BLU POLYPR HUB S STL REG BVL STR

## (undated) DEVICE — BANDAGE ADH W1XL3IN NAT FAB WVN FLX DURABLE N ADH PD SEAL

## (undated) DEVICE — MARKER,SKIN,WI/RULER AND LABELS: Brand: MEDLINE

## (undated) DEVICE — ENCORE® LATEX TEXTURED SIZE 6.5, STERILE LATEX POWDER-FREE SURGICAL GLOVE: Brand: ENCORE

## (undated) DEVICE — NON-DEHP CATHETER EXTENSION SET, MALE LUER LOCK ADAPTER